# Patient Record
Sex: FEMALE | Race: WHITE | NOT HISPANIC OR LATINO | URBAN - METROPOLITAN AREA
[De-identification: names, ages, dates, MRNs, and addresses within clinical notes are randomized per-mention and may not be internally consistent; named-entity substitution may affect disease eponyms.]

---

## 2017-06-01 ENCOUNTER — OUTPATIENT (OUTPATIENT)
Dept: OUTPATIENT SERVICES | Facility: HOSPITAL | Age: 42
LOS: 1 days | End: 2017-06-01
Payer: MEDICAID

## 2017-06-07 DIAGNOSIS — R69 ILLNESS, UNSPECIFIED: ICD-10-CM

## 2018-06-01 PROCEDURE — G9005: CPT

## 2018-06-01 PROCEDURE — G9001: CPT

## 2018-07-01 ENCOUNTER — OUTPATIENT (OUTPATIENT)
Dept: OUTPATIENT SERVICES | Facility: HOSPITAL | Age: 43
LOS: 1 days | End: 2018-07-01
Payer: COMMERCIAL

## 2018-07-10 DIAGNOSIS — Z71.89 OTHER SPECIFIED COUNSELING: ICD-10-CM

## 2019-12-27 PROBLEM — Z00.00 ENCOUNTER FOR PREVENTIVE HEALTH EXAMINATION: Status: ACTIVE | Noted: 2019-12-27

## 2019-12-30 ENCOUNTER — APPOINTMENT (OUTPATIENT)
Dept: ORTHOPEDIC SURGERY | Facility: CLINIC | Age: 44
End: 2019-12-30
Payer: MEDICAID

## 2019-12-30 ENCOUNTER — TRANSCRIPTION ENCOUNTER (OUTPATIENT)
Age: 44
End: 2019-12-30

## 2019-12-30 VITALS
DIASTOLIC BLOOD PRESSURE: 95 MMHG | BODY MASS INDEX: 30.77 KG/M2 | WEIGHT: 203 LBS | SYSTOLIC BLOOD PRESSURE: 155 MMHG | HEART RATE: 83 BPM | HEIGHT: 68 IN

## 2019-12-30 DIAGNOSIS — Z87.09 PERSONAL HISTORY OF OTHER DISEASES OF THE RESPIRATORY SYSTEM: ICD-10-CM

## 2019-12-30 DIAGNOSIS — F17.200 NICOTINE DEPENDENCE, UNSPECIFIED, UNCOMPLICATED: ICD-10-CM

## 2019-12-30 DIAGNOSIS — Z86.718 PERSONAL HISTORY OF OTHER VENOUS THROMBOSIS AND EMBOLISM: ICD-10-CM

## 2019-12-30 DIAGNOSIS — Z78.9 OTHER SPECIFIED HEALTH STATUS: ICD-10-CM

## 2019-12-30 DIAGNOSIS — F17.210 NICOTINE DEPENDENCE, CIGARETTES, UNCOMPLICATED: ICD-10-CM

## 2019-12-30 DIAGNOSIS — M17.10 UNILATERAL PRIMARY OSTEOARTHRITIS, UNSPECIFIED KNEE: ICD-10-CM

## 2019-12-30 DIAGNOSIS — Z86.19 PERSONAL HISTORY OF OTHER INFECTIOUS AND PARASITIC DISEASES: ICD-10-CM

## 2019-12-30 PROCEDURE — 99205 OFFICE O/P NEW HI 60 MIN: CPT | Mod: 25

## 2019-12-30 PROCEDURE — 73562 X-RAY EXAM OF KNEE 3: CPT | Mod: RT

## 2019-12-30 PROCEDURE — 20610 DRAIN/INJ JOINT/BURSA W/O US: CPT | Mod: RT

## 2019-12-30 RX ORDER — DEXTROAMPHETAMINE SACCHARATE, AMPHETAMINE ASPARTATE, DEXTROAMPHETAMINE SULFATE, AND AMPHETAMINE SULFATE 5; 5; 5; 5 MG/1; MG/1; MG/1; MG/1
20 TABLET ORAL
Refills: 0 | Status: ACTIVE | COMMUNITY

## 2019-12-30 RX ORDER — IBUPROFEN 400 MG
400 TABLET ORAL
Refills: 0 | Status: ACTIVE | COMMUNITY

## 2019-12-30 RX ORDER — DIAZEPAM 10 MG/1
10 TABLET ORAL
Refills: 0 | Status: ACTIVE | COMMUNITY

## 2020-06-30 ENCOUNTER — APPOINTMENT (OUTPATIENT)
Dept: ORTHOPEDIC SURGERY | Facility: CLINIC | Age: 45
End: 2020-06-30
Payer: MEDICAID

## 2020-06-30 VITALS
DIASTOLIC BLOOD PRESSURE: 96 MMHG | HEART RATE: 83 BPM | WEIGHT: 206 LBS | HEIGHT: 68 IN | BODY MASS INDEX: 31.22 KG/M2 | SYSTOLIC BLOOD PRESSURE: 150 MMHG

## 2020-06-30 VITALS — TEMPERATURE: 98.6 F

## 2020-06-30 PROCEDURE — 20610 DRAIN/INJ JOINT/BURSA W/O US: CPT | Mod: RT

## 2020-06-30 PROCEDURE — 99213 OFFICE O/P EST LOW 20 MIN: CPT | Mod: 25

## 2020-06-30 NOTE — HISTORY OF PRESENT ILLNESS
[Pain Location] : pain [Worsening] : worsening [___ yrs] : [unfilled] year(s) ago [7] : a current pain level of 7/10 [5] : an average pain level of 5/10 [3] : a minimum pain level of 3/10 [8] : a maximum pain level of 8/10 [Standing] : standing [Walking] : worsened by walking [Daily] : ~He/She~ states the symptoms seem to be occuring daily [Rest] : relieved by rest [NSAIDs] : relieved by nonsteroidal anti-inflammatory drugs [de-identified] : 44 year old female here for f/u of right  knee pain. She is s/p right knee arthroscopy from 2003 after ACL injury to the right knee during her teens. She rates pain 8/10 in severity and describes pain as sharp and throbbing. Pain is worse with standing, navigating stairs, squatting, and when exercising. She is using compression wrap for support and pain relief. She reports medical history of endocarditis. She reports history of ulcers on her bilateral lower extremities. She does report current healing wound on left lower extremity. She reports history of pulmonary embolism. Patient currently taking Eliquis. Patient takes Gabapentin with relief of pain. She finished treatment with methadone. Patient not currently under treatment by pain management. \par pt states she recently come off of methadone.\par pt is taking advil few times a day that is allowed by her cardiologist. the advil helps a lot.\par pt states the last cortisone injection helped a lot.\par \par \par She states the symptoms are worsening. Pain levels include a current pain level of 8/10. \par She states the symptoms seem to be intermittent. \par

## 2020-06-30 NOTE — DISCUSSION/SUMMARY
[de-identified] : Surgical risks reviewed. 45 year old female with severe tricompartmental osteoarthritis of the right knee. She is high risk for surgery due to history of DVT, PE, and chance for relapse of previous narcotic addiction. We will continue with conservative treatment due to the risks. She elected to receive a cortisone injection in her right knee which she tolerated well. She will follow-up 3 months for repeat cortisone injection if needed. \par \par The patient is a 45 year old individual with end stage arthritis of their right knee joint. Based upon the patient's continued symptoms and failure to respond to conservative treatment I have recommended a right knee replacement for this patient. A long discussion took place with the patient describing what a total joint replacement is and what the procedure would entail. A total knee model, similar to the implant that will be used during the operation, was utilized to demonstrate and to discuss the various bearing surfaces of the implants. The hospitalization and post-operative care and rehabilitation were also discussed. The use of perioperative antibiotics and DVT prophylaxis were discussed. The risk, benefits and alternatives to a surgical intervention were discussed at length with the patient. The patient was also advised of risks related to the medical comorbidities and elevated body mass index (BMI).  A lengthy discussion took place to review the most common complications including but not limited to: deep vein thrombosis, pulmonary embolus, heart attack, stroke, infection, wound breakdown, numbness, damage to nerves, tendon, muscles, arteries or other blood vessels, death and other possible complications from anesthesia. The patient was told that we will take steps to minimize these risks by using sterile technique, antibiotics and DVT prophylaxis when appropriate and follow the patient postoperatively in the office setting to monitor progress. The possibility of recurrent pain, no improvement in pain and actual worsening of pain were also discussed with the patient.\par The discharge plan of care focused on the patient going home following surgery.  The patient was encouraged to make the necessary arrangements to have someone stay with them when they are discharged home.  Following discharge, a home care nurse will visit the patient.  The home care nurse will open your home care case and request home physical therapy services.  Home physical therapy will commence following discharge provided it is appropriate and covered by the health insurance benefit plan. \par The benefits of surgery were discussed with the patient including the potential for improving his/her current clinical condition through operative intervention. Alternatives to surgical intervention including continued conservative management were also discussed in detail. All questions were answered to the satisfaction of the patient. The treatment plan of care, as well as a model of a total knee equivalent to the one that will be used for their total joint replacement, was shared with the patient.  The patient agreed to the plan of care as well as the use of implants in their total joint replacement.\par

## 2020-06-30 NOTE — PHYSICAL EXAM

## 2020-06-30 NOTE — PROCEDURE
[de-identified] : I injected the patient's right knee today with cortisone.\par \par I discussed at length with the patient the planned steroid and lidocaine injection. The risks, benefits, convalescence and alternatives were reviewed. The possible side effects discussed included but were not limited to: pain, swelling, heat, bleeding, and redness. Symptoms are generally mild but if they are extensive then contact the office. Giving pain relievers by mouth such as NSAIDs or Tylenol can generally treat the reactions to steroid and lidocaine. Rare cases of infection have been noted. Rash, hives and itching may occur post injection. If you have muscle pain or cramps, flushing and or swelling of the face, rapid heart beat, nausea, dizziness, fever, chills, headache, difficulty breathing, swelling in the arms or legs, or have a prickly feeling of your skin, contact a health care provider immediately. Following this discussion, the knee was prepped with Alcohol and under sterile condition the 80 mg Depo-Medrol and 6 cc Lidocaine injection was performed with a 20 gauge needle through a superolateral injection site. The needle was introduced into the joint, aspiration was performed to ensure intra-articular placement and the medication was injected. Upon withdrawal of the needle the site was cleaned with alcohol and a band aid applied. The patient tolerated the injection well and there were no adverse effects. Post injection instructions included no strenuous activity for 24 hours, cryotherapy and if there are any adverse effects to contact the office. \par \par

## 2020-09-30 ENCOUNTER — APPOINTMENT (OUTPATIENT)
Dept: ORTHOPEDIC SURGERY | Facility: CLINIC | Age: 45
End: 2020-09-30
Payer: MEDICAID

## 2020-09-30 VITALS
HEART RATE: 76 BPM | SYSTOLIC BLOOD PRESSURE: 147 MMHG | WEIGHT: 206 LBS | BODY MASS INDEX: 31.22 KG/M2 | DIASTOLIC BLOOD PRESSURE: 89 MMHG | HEIGHT: 68 IN

## 2020-09-30 PROCEDURE — 99214 OFFICE O/P EST MOD 30 MIN: CPT | Mod: 25

## 2020-09-30 PROCEDURE — 20610 DRAIN/INJ JOINT/BURSA W/O US: CPT

## 2020-09-30 NOTE — HISTORY OF PRESENT ILLNESS
[Pain Location] : pain [Worsening] : worsening [___ yrs] : [unfilled] year(s) ago [7] : a current pain level of 7/10 [5] : an average pain level of 5/10 [3] : a minimum pain level of 3/10 [8] : a maximum pain level of 8/10 [Standing] : standing [Daily] : ~He/She~ states the symptoms seem to be occuring daily [Walking] : worsened by walking [NSAIDs] : relieved by nonsteroidal anti-inflammatory drugs [Rest] : relieved by rest [de-identified] : 45 year old female here for f/u of right  knee pain. She is s/p right knee arthroscopy from 2003 after ACL injury to the right knee during her teens. She describes pain as sharp and throbbing. Pain is worse with standing, navigating stairs, squatting, and when exercising. She is using compression wrap for support and pain relief. She reports medical history of endocarditis. She reports history of ulcers on her bilateral lower extremities. She does report current healing wound on left lower extremity. She reports history of pulmonary embolism. She is now able to stop taking blood thinners Patient takes Gabapentin with relief of pain. She finished treatment with methadone several months ago; however, she started taking methadone again recently for pain management. Pt states the last cortisone injection helped a lot.

## 2020-09-30 NOTE — PROCEDURE
[de-identified] : I injected the patient's right knee today with cortisone.\par \par I discussed at length with the patient the planned steroid and lidocaine injection. The risks, benefits, convalescence and alternatives were reviewed. The possible side effects discussed included but were not limited to: pain, swelling, heat, bleeding, and redness. Symptoms are generally mild but if they are extensive then contact the office. Giving pain relievers by mouth such as NSAIDs or Tylenol can generally treat the reactions to steroid and lidocaine. Rare cases of infection have been noted. Rash, hives and itching may occur post injection. If you have muscle pain or cramps, flushing and or swelling of the face, rapid heart beat, nausea, dizziness, fever, chills, headache, difficulty breathing, swelling in the arms or legs, or have a prickly feeling of your skin, contact a health care provider immediately. Following this discussion, the knee was prepped with Alcohol and under sterile condition the 80 mg Depo-Medrol and 6 cc Lidocaine injection was performed with a 20 gauge needle through a superolateral injection site. The needle was introduced into the joint, aspiration was performed to ensure intra-articular placement and the medication was injected. Upon withdrawal of the needle the site was cleaned with alcohol and a band aid applied. The patient tolerated the injection well and there were no adverse effects. Post injection instructions included no strenuous activity for 24 hours, cryotherapy and if there are any adverse effects to contact the office. \par \par

## 2020-09-30 NOTE — DISCUSSION/SUMMARY
[Surgical risks reviewed] : Surgical risks reviewed [de-identified] : 45 year old female with severe tricompartmental osteoarthritis of the right knee. Conservative therapy and surgical options discussed in detail with the patient. She is a candidate for a right TKA. She is high risk for surgery due to history of DVT, PE, and chance for relapse of previous narcotic addiction. She elected to receive a cortisone injection in her right knee which she tolerated well. The patient will f/u with her pain management doctor prior to surgery. Pt understands that she will need to take blood thinners after surgery. Pt scheduled surgery today.\par \par \par The patient is a 45 year old individual with end stage arthritis of their right knee joint. Based upon the patient's continued symptoms and failure to respond to conservative treatment I have recommended a right knee replacement for this patient. A long discussion took place with the patient describing what a total joint replacement is and what the procedure would entail. A total knee model, similar to the implant that will be used during the operation, was utilized to demonstrate and to discuss the various bearing surfaces of the implants. The hospitalization and post-operative care and rehabilitation were also discussed. The use of perioperative antibiotics and DVT prophylaxis were discussed. The risk, benefits and alternatives to a surgical intervention were discussed at length with the patient. The patient was also advised of risks related to the medical comorbidities and elevated body mass index (BMI).  A lengthy discussion took place to review the most common complications including but not limited to: deep vein thrombosis, pulmonary embolus, heart attack, stroke, infection, wound breakdown, numbness, damage to nerves, tendon, muscles, arteries or other blood vessels, death and other possible complications from anesthesia. The patient was told that we will take steps to minimize these risks by using sterile technique, antibiotics and DVT prophylaxis when appropriate and follow the patient postoperatively in the office setting to monitor progress. The possibility of recurrent pain, no improvement in pain and actual worsening of pain were also discussed with the patient.\par The discharge plan of care focused on the patient going home following surgery.  The patient was encouraged to make the necessary arrangements to have someone stay with them when they are discharged home.  Following discharge, a home care nurse will visit the patient.  The home care nurse will open your home care case and request home physical therapy services.  Home physical therapy will commence following discharge provided it is appropriate and covered by the health insurance benefit plan. \par The benefits of surgery were discussed with the patient including the potential for improving his/her current clinical condition through operative intervention. Alternatives to surgical intervention including continued conservative management were also discussed in detail. All questions were answered to the satisfaction of the patient. The treatment plan of care, as well as a model of a total knee equivalent to the one that will be used for their total joint replacement, was shared with the patient.  The patient agreed to the plan of care as well as the use of implants in their total joint replacement.

## 2020-09-30 NOTE — END OF VISIT
[FreeTextEntry3] : I, oJshua Stephens, acted solely as a scribe for Dr. Yeison Sauer on this date 09/30/2020.

## 2020-09-30 NOTE — PHYSICAL EXAM

## 2020-12-24 DIAGNOSIS — Z01.818 ENCOUNTER FOR OTHER PREPROCEDURAL EXAMINATION: ICD-10-CM

## 2020-12-26 ENCOUNTER — APPOINTMENT (OUTPATIENT)
Dept: DISASTER EMERGENCY | Facility: CLINIC | Age: 45
End: 2020-12-26

## 2020-12-26 LAB — SARS-COV-2 N GENE NPH QL NAA+PROBE: NOT DETECTED

## 2020-12-28 ENCOUNTER — OUTPATIENT (OUTPATIENT)
Dept: OUTPATIENT SERVICES | Facility: HOSPITAL | Age: 45
LOS: 1 days | End: 2020-12-28
Payer: COMMERCIAL

## 2020-12-28 ENCOUNTER — RESULT REVIEW (OUTPATIENT)
Age: 45
End: 2020-12-28

## 2020-12-28 VITALS
SYSTOLIC BLOOD PRESSURE: 110 MMHG | HEART RATE: 76 BPM | TEMPERATURE: 98 F | DIASTOLIC BLOOD PRESSURE: 80 MMHG | RESPIRATION RATE: 16 BRPM | HEIGHT: 68 IN | WEIGHT: 190.48 LBS

## 2020-12-28 DIAGNOSIS — Z01.818 ENCOUNTER FOR OTHER PREPROCEDURAL EXAMINATION: ICD-10-CM

## 2020-12-28 DIAGNOSIS — Z13.89 ENCOUNTER FOR SCREENING FOR OTHER DISORDER: ICD-10-CM

## 2020-12-28 DIAGNOSIS — Z29.9 ENCOUNTER FOR PROPHYLACTIC MEASURES, UNSPECIFIED: ICD-10-CM

## 2020-12-28 DIAGNOSIS — M17.11 UNILATERAL PRIMARY OSTEOARTHRITIS, RIGHT KNEE: ICD-10-CM

## 2020-12-28 DIAGNOSIS — Z98.890 OTHER SPECIFIED POSTPROCEDURAL STATES: Chronic | ICD-10-CM

## 2020-12-28 LAB
A1C WITH ESTIMATED AVERAGE GLUCOSE RESULT: 5.6 % — SIGNIFICANT CHANGE UP (ref 4–5.6)
ALBUMIN SERPL ELPH-MCNC: 4.2 G/DL — SIGNIFICANT CHANGE UP (ref 3.3–5.2)
ALP SERPL-CCNC: 112 U/L — SIGNIFICANT CHANGE UP (ref 40–120)
ALT FLD-CCNC: 10 U/L — SIGNIFICANT CHANGE UP
ANION GAP SERPL CALC-SCNC: 11 MMOL/L — SIGNIFICANT CHANGE UP (ref 5–17)
APTT BLD: 39.5 SEC — HIGH (ref 27.5–35.5)
AST SERPL-CCNC: 18 U/L — SIGNIFICANT CHANGE UP
BASOPHILS # BLD AUTO: 0.04 K/UL — SIGNIFICANT CHANGE UP (ref 0–0.2)
BASOPHILS NFR BLD AUTO: 0.5 % — SIGNIFICANT CHANGE UP (ref 0–2)
BILIRUB SERPL-MCNC: 0.3 MG/DL — LOW (ref 0.4–2)
BLD GP AB SCN SERPL QL: SIGNIFICANT CHANGE UP
BUN SERPL-MCNC: 10 MG/DL — SIGNIFICANT CHANGE UP (ref 8–20)
CALCIUM SERPL-MCNC: 9.6 MG/DL — SIGNIFICANT CHANGE UP (ref 8.6–10.2)
CHLORIDE SERPL-SCNC: 100 MMOL/L — SIGNIFICANT CHANGE UP (ref 98–107)
CO2 SERPL-SCNC: 28 MMOL/L — SIGNIFICANT CHANGE UP (ref 22–29)
CREAT SERPL-MCNC: 0.65 MG/DL — SIGNIFICANT CHANGE UP (ref 0.5–1.3)
EOSINOPHIL # BLD AUTO: 0.23 K/UL — SIGNIFICANT CHANGE UP (ref 0–0.5)
EOSINOPHIL NFR BLD AUTO: 2.7 % — SIGNIFICANT CHANGE UP (ref 0–6)
ESTIMATED AVERAGE GLUCOSE: 114 MG/DL — SIGNIFICANT CHANGE UP (ref 68–114)
GLUCOSE SERPL-MCNC: 90 MG/DL — SIGNIFICANT CHANGE UP (ref 70–99)
HCT VFR BLD CALC: 45.9 % — HIGH (ref 34.5–45)
HGB BLD-MCNC: 14.9 G/DL — SIGNIFICANT CHANGE UP (ref 11.5–15.5)
IMM GRANULOCYTES NFR BLD AUTO: 0.2 % — SIGNIFICANT CHANGE UP (ref 0–1.5)
INR BLD: 1.16 RATIO — SIGNIFICANT CHANGE UP (ref 0.88–1.16)
LYMPHOCYTES # BLD AUTO: 2.88 K/UL — SIGNIFICANT CHANGE UP (ref 1–3.3)
LYMPHOCYTES # BLD AUTO: 34.4 % — SIGNIFICANT CHANGE UP (ref 13–44)
MCHC RBC-ENTMCNC: 30 PG — SIGNIFICANT CHANGE UP (ref 27–34)
MCHC RBC-ENTMCNC: 32.5 GM/DL — SIGNIFICANT CHANGE UP (ref 32–36)
MCV RBC AUTO: 92.4 FL — SIGNIFICANT CHANGE UP (ref 80–100)
MONOCYTES # BLD AUTO: 0.5 K/UL — SIGNIFICANT CHANGE UP (ref 0–0.9)
MONOCYTES NFR BLD AUTO: 6 % — SIGNIFICANT CHANGE UP (ref 2–14)
MRSA PCR RESULT.: SIGNIFICANT CHANGE UP
NEUTROPHILS # BLD AUTO: 4.7 K/UL — SIGNIFICANT CHANGE UP (ref 1.8–7.4)
NEUTROPHILS NFR BLD AUTO: 56.2 % — SIGNIFICANT CHANGE UP (ref 43–77)
PLATELET # BLD AUTO: 261 K/UL — SIGNIFICANT CHANGE UP (ref 150–400)
POTASSIUM SERPL-MCNC: 4.4 MMOL/L — SIGNIFICANT CHANGE UP (ref 3.5–5.3)
POTASSIUM SERPL-SCNC: 4.4 MMOL/L — SIGNIFICANT CHANGE UP (ref 3.5–5.3)
PROT SERPL-MCNC: 9.1 G/DL — HIGH (ref 6.6–8.7)
PROTHROM AB SERPL-ACNC: 13.4 SEC — SIGNIFICANT CHANGE UP (ref 10.6–13.6)
RBC # BLD: 4.97 M/UL — SIGNIFICANT CHANGE UP (ref 3.8–5.2)
RBC # FLD: 13.7 % — SIGNIFICANT CHANGE UP (ref 10.3–14.5)
S AUREUS DNA NOSE QL NAA+PROBE: SIGNIFICANT CHANGE UP
SODIUM SERPL-SCNC: 139 MMOL/L — SIGNIFICANT CHANGE UP (ref 135–145)
WBC # BLD: 8.37 K/UL — SIGNIFICANT CHANGE UP (ref 3.8–10.5)
WBC # FLD AUTO: 8.37 K/UL — SIGNIFICANT CHANGE UP (ref 3.8–10.5)

## 2020-12-28 PROCEDURE — 71046 X-RAY EXAM CHEST 2 VIEWS: CPT | Mod: 26

## 2020-12-28 PROCEDURE — G0463: CPT

## 2020-12-28 PROCEDURE — 93005 ELECTROCARDIOGRAM TRACING: CPT

## 2020-12-28 PROCEDURE — 93010 ELECTROCARDIOGRAM REPORT: CPT

## 2020-12-28 PROCEDURE — 71046 X-RAY EXAM CHEST 2 VIEWS: CPT

## 2020-12-28 NOTE — H&P PST ADULT - HISTORY OF PRESENT ILLNESS
44 yo F c/o right knee pain. She is s/p right knee arthroscopy from 2003 after ACL injury to the right knee during her teens. She describes pain as sharp and throbbing. Pain is worse with standing, navigating stairs, squatting, and when exercising. She is using compression wrap for support and pain relief. She reports medical history of endocarditis. She reports history of ulcers on her bilateral lower extremities. She does report current healing wound on left lower extremity. She reports history of pulmonary embolism. Pt takes Gabapentin with relief of pain. She finished treatment with methadone several months ago; however, she started taking methadone again recently for pain management. Pt states the last cortisone injection helped a lot.  She states the symptoms are worsening. Pt reports the symptoms started many years ago. Pain levels include a current pain level of 7/10, an average pain level of 5/10, a minimum pain level of 3/10 and a maximum pain level of 8/10. Her symptoms occur while walking and standing. She states the symptoms seem to be occuring daily. Modifying factors: worsened by walking. Relieving factors include nonsteroidal anti-inflammatory drugs and rest. Presents today for preop assessment prior to right TKR w/Dr Sauer on 1/15       44 yo F PMH of DVT (on eliquis), OA of right knee, c/o right knee pain. Pt is s/p right knee arthroscopy from 2003 after ACL injury to the right knee during her teens. She describes pain as sharp, throbbing and excruciating. Pain is worse with standing, navigating stairs, squatting, and when exercising. She is using compression wrap for support and pain relief. She reports medical history of endocarditis. Pt reports Hx of ulcers on her bilateral lower extremities. She does report current healing wound on left and right LE. She reports history of pulmonary embolism. Pt takes Gabapentin with relief of pain. She finished treatment with methadone several months ago; however, she started taking methadone again recently for pain management. Pt states the last cortisone injection helped a lot.  She states the symptoms are worsening. Pt reports the symptoms started many years ago. Pain levels include a current pain level of 7/10, an average pain level of 5/10, a minimum pain level of 3/10 and a maximum pain level of 8/10. Her symptoms occur while walking and standing. She states the symptoms seem to be occuring daily. Modifying factors: worsened by walking. Relieving factors include nonsteroidal anti-inflammatory drugs and rest. Presents today for preop assessment prior to right TKR w/Dr Saure on 1/15       44 yo F PMH of COPD, DVT (on eliquis), current smoker, depresson and anxiety, OA of right knee, c/o right knee pain. Pt is s/p right knee arthroscopy from 2003 after ACL injury to the right knee during her teens. She describes pain as sharp, throbbing and excruciating. Pain is worse with standing, navigating stairs, squatting, and when exercising. She is using compression wrap for support and pain relief. She reports medical history of endocarditis. Pt reports Hx of ulcers on her bilateral lower extremities. She does report current healing wound on left and right LE. She reports history of pulmonary embolism. Pt takes Gabapentin with relief of pain. She finished treatment with methadone several months ago; however, she started taking methadone again recently for pain management. Pt states the last cortisone injection helped a lot.  She states the symptoms are worsening. Pt reports the symptoms started many years ago. Pain levels include a current pain level of 7/10, an average pain level of 5/10, a minimum pain level of 3/10 and a maximum pain level of 8/10. Her symptoms occur while walking and standing. She states the symptoms seem to be occuring daily. Modifying factors: worsened by walking. Relieving factors include nonsteroidal anti-inflammatory drugs and rest. Presents today for preop assessment prior to right TKR w/Dr Sauer on 1/15       46 yo F PMH of COPD, DVT/PE (on eliquis), current smoker, depression and anxiety, OA of right knee, c/o right knee pain. Pt had right knee arthroscopy in 2003 after ACL injury to the right knee during her teens. Pt describes her pain as excruciating. Pain is worse with standing, navigating stairs, squatting, and when exercising. She is using compression wrap for support and pain relief. She reports medical history of endocarditis. Pt reports Hx of ulcers on her bilateral lower extremities. She has current healed wounds on left and right LE. Pt takes Gabapentin with relief of pain. She finished treatment with methadone several months ago; however, she started taking methadone again recently for pain management. Pt states the last cortisone injection helped a lot.  She states the symptoms are worsening. Pt reports the symptoms started many years ago. Pain levels include a current pain level of 7/10, an average pain level of 5/10, a minimum pain level of 3/10 and a maximum pain level of 8/10. Her symptoms occur while walking and standing. She states the symptoms seem to be occuring daily. Modifying factors: worsened by walking. Relieving factors include nonsteroidal anti-inflammatory drugs and rest. Presents today for preop assessment prior to right TKR w/Dr Sauer on 1/15       44 yo F PMH of COPD, DVT/PE (on eliquis), current smoker, depression and anxiety, OA of right knee, c/o right knee pain. Pt had right knee arthroscopy in 2003 after ACL injury to the right knee during her teens. Pt describes her pain as chronic, excruciating and aggravated by standing, navigating stairs, squatting, and exercising. She reports medical history of endocarditis. Pt reports Hx of ulcers on her bilateral lower extremities. She has current healed wounds on left and right LE. She finished treatment with methadone several months ago; however, she started taking methadone again recently for pain management. She states the symptoms are worsening. Pain levels include an average pain level of 5/10, a minimum pain level of 3/10, and a maximum pain level of 10/10. Modifying factors: worsened by walking. Relieving factors include NSAIDs, steroid injections, rest, and wearing a compression wrap. Presents today for preop assessment prior to right TKR w/Dr Sauer on 1/15       46 yo F PMH of COPD, DVT/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 Pt had right knee arthroscopy in 2003 after ACL injury to the right knee during her teens. Pt describes her pain as chronic, excruciating and aggravated by standing, navigating stairs, squatting, and exercising. She reports medical history of endocarditis. Pt reports Hx of ulcers on her bilateral lower extremities. She has current healed wounds on left and right LE. She finished treatment with methadone several months ago; however, she started taking methadone again recently for pain management. She states the symptoms are worsening. Pain levels include an average pain level of 5/10, a minimum pain level of 3/10, and a maximum pain level of 10/10. Modifying factors: worsened by walking. Relieving factors include NSAIDs, steroid injections, rest, and wearing a compression wrap. Presents today for preop assessment prior to right TKR w/Dr Sauer on 1/15

## 2020-12-28 NOTE — H&P PST ADULT - NSICDXPASTMEDICALHX_GEN_ALL_CORE_FT
PAST MEDICAL HISTORY:  Osteoarthritis of right knee      PAST MEDICAL HISTORY:  Anxiety and depression     COPD without exacerbation     Current smoker     H/O endocarditis     Osteoarthritis of right knee     Personal history of DVT (deep vein thrombosis) on Eliquis

## 2020-12-28 NOTE — H&P PST ADULT - ASSESSMENT
46 yo F c/o right knee pain. She is s/p right knee arthroscopy from  after ACL injury to the right knee during her teens. She describes pain as sharp and throbbing. Pain is worse with standing, navigating stairs, squatting, and when exercising. She is using compression wrap for support and pain relief. She reports medical history of endocarditis. She reports history of ulcers on her bilateral lower extremities. She does report current healing wound on left lower extremity. She reports history of pulmonary embolism. Pt takes Gabapentin with relief of pain. She finished treatment with methadone several months ago; however, she started taking methadone again recently for pain management. Pt states the last cortisone injection helped a lot.  She states the symptoms are worsening. Pt reports the symptoms started many years ago. Pain levels include a current pain level of 7/10, an average pain level of 5/10, a minimum pain level of 3/10 and a maximum pain level of 8/10. Her symptoms occur while walking and standing. She states the symptoms seem to be occuring daily. Modifying factors: worsened by walking. Relieving factors include nonsteroidal anti-inflammatory drugs and rest. Presents today for preop assessment prior to right TKR w/Dr Sauer on 1/15      OPIOID RISK TOOL    MIGUELANGEL EACH BOX THAT APPLIES AND ADD TOTALS AT THE END    FAMILY HISTORY OF SUBSTANCE ABUSE                 FEMALE         MALE                                                Alcohol                             [  ]1 pt          [  ]3pts                                               Illegal Durgs                     [  ]2 pts        [  ]3pts                                               Rx Drugs                           [  ]4 pts        [  ]4 pts    PERSONAL HISTORY OF SUBSTANCE ABUSE                                                                                          Alcohol                             [  ]3 pts       [  ]3 pts                                               Illegal Drugs                     [  ]4 pts        [  ]4 pts                                               Rx Drugs                           [  ]5 pts        [  ]5 pts    AGE BETWEEN 16-45 YEARS                                      [  ]1 pt         [  ]1 pt    HISTORY OF PREADOLESCENT   SEXUAL ABUSE                                                             [  ]3 pts        [  ]0pts    PSYCHOLOGICAL DISEASE                     ADD, OCD, Bipolar, Schizophrenia        [  ]2 pts         [  ]2 pts                      Depression                                               [  ]1 pt           [  ]1 pt           SCORING TOTAL   (add numbers and type here)              ( 0 )                                     A score of 3 or lower indicated LOW risk for future opioid abuse  A score of 4 to 7 indicated moderate risk for future opioid abuse  A score of 8 or higher indicates a high risk for opioid abuse    CAPRINI SCORE [CLOT]    AGE RELATED RISK FACTORS                                                       MOBILITY RELATED FACTORS  [x ] Age 41-60 years                                            (1 Point)                  [ ] Bed rest                                                        (1 Point)  [ ] Age: 61-74 years                                           (2 Points)                 [ ] Plaster cast                                                   (2 Points)  [ ] Age= 75 years                                              (3 Points)                 [ ] Bed bound for more than 72 hours                 (2 Points)    DISEASE RELATED RISK FACTORS                                               GENDER SPECIFIC FACTORS  [ ] Edema in the lower extremities                       (1 Point)                  [ ] Pregnancy                                                     (1 Point)  [ ] Varicose veins                                               (1 Point)                  [ ] Post-partum < 6 weeks                                   (1 Point)             [ ] BMI > 25 Kg/m2                                            (1 Point)                  [ ] Hormonal therapy  or oral contraception          (1 Point)                 [ ] Sepsis (in the previous month)                        (1 Point)                  [ ] History of pregnancy complications                 (1 point)  [ ] Pneumonia or serious lung disease                                               [ ] Unexplained or recurrent                     (1 Point)           (in the previous month)                               (1 Point)  [ ] Abnormal pulmonary function test                     (1 Point)                 SURGERY RELATED RISK FACTORS  [ ] Acute myocardial infarction                              (1 Point)                 [ ]  Section                                             (1 Point)  [ ] Congestive heart failure (in the previous month)  (1 Point)               [ ] Minor surgery                                                  (1 Point)   [ ] Inflammatory bowel disease                             (1 Point)                 [ ] Arthroscopic surgery                                        (2 Points)  [ ] Central venous access                                      (2 Points)                [ x] General surgery lasting more than 45 minutes   (2 Points)       [ ] Stroke (in the previous month)                          (5 Points)               [ ] Elective arthroplasty                                         (5 Points)                                                                                                                                               HEMATOLOGY RELATED FACTORS                                                 TRAUMA RELATED RISK FACTORS  [ x] Prior episodes of VTE                                     (3 Points)                [ ] Fracture of the hip, pelvis, or leg                       (5 Points)  [ ] Positive family history for VTE                         (3 Points)                 [ ] Acute spinal cord injury (in the previous month)  (5 Points)  [ ] Prothrombin 29617 A                                     (3 Points)                 [ ] Paralysis  (less than 1 month)                             (5 Points)  [ ] Factor V Leiden                                             (3 Points)                  [ ] Multiple Trauma within 1 month                        (5 Points)  [ ] Lupus anticoagulants                                     (3 Points)                                                           [ ] Anticardiolipin antibodies                               (3 Points)                                                       [ ] High homocysteine in the blood                      (3 Points)                                             [ ] Other congenital or acquired thrombophilia      (3 Points)                                                [ ] Heparin induced thrombocytopenia                  (3 Points)                                          Total Score [     6     ]    Caprini Score 0 - 2:  Low Risk, No VTE Prophylaxis required for most patients, encourage ambulation  Caprini Score 3 - 6:  At Risk, pharmacologic VTE prophylaxis is indicated for most patients (in the absence of a contraindication)  Caprini Score Greater than or = 7:  High Risk, pharmacologic VTE prophylaxis is indicated for most patients (in the absence of a contraindication) 44 yo F PMH of COPD, DVT/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 Pt had right knee arthroscopy in  after ACL injury to the right knee during her teens. Pt describes her pain as chronic, excruciating and aggravated by standing, navigating stairs, squatting, and exercising. She reports medical history of endocarditis. Pt reports Hx of ulcers on her bilateral lower extremities. She has current healed wounds on left and right LE. She finished treatment with methadone several months ago; however, she started taking methadone again recently for pain management. She states the symptoms are worsening. Pain levels include an average pain level of 5/10, a minimum pain level of 3/10, and a maximum pain level of 10/10. Modifying factors: worsened by walking. Relieving factors include NSAIDs, steroid injections, rest, and wearing a compression wrap. Presents today for preop assessment prior to right TKR w/Dr Sauer on 1/15    OPIOID RISK TOOL    MIGUELANGEL EACH BOX THAT APPLIES AND ADD TOTALS AT THE END    FAMILY HISTORY OF SUBSTANCE ABUSE                 FEMALE         MALE                                                Alcohol                             [  ]1 pt          [  ]3pts                                               Illegal Durgs                     [  ]2 pts        [  ]3pts                                               Rx Drugs                           [  ]4 pts        [  ]4 pts    PERSONAL HISTORY OF SUBSTANCE ABUSE                                                                                          Alcohol                             [  ]3 pts       [  ]3 pts                                               Illegal Drugs                     [x  ]4 pts        [  ]4 pts                                               Rx Drugs                           [  ]5 pts        [  ]5 pts    AGE BETWEEN 16-45 YEARS                                      [  ]1 pt         [  ]1 pt    HISTORY OF PREADOLESCENT   SEXUAL ABUSE                                                             [  ]3 pts        [  ]0pts    PSYCHOLOGICAL DISEASE                     ADD, OCD, Bipolar, Schizophrenia        [  ]2 pts         [  ]2 pts                      Depression                                               [x  ]1 pt           [  ]1 pt           SCORING TOTAL   (add numbers and type here)              ( 5 )                                     A score of 3 or lower indicated LOW risk for future opioid abuse  A score of 4 to 7 indicated moderate risk for future opioid abuse  A score of 8 or higher indicates a high risk for opioid abuse    CAPRINI SCORE [CLOT]    AGE RELATED RISK FACTORS                                                       MOBILITY RELATED FACTORS  [x ] Age 41-60 years                                            (1 Point)                  [ ] Bed rest                                                        (1 Point)  [ ] Age: 61-74 years                                           (2 Points)                 [ ] Plaster cast                                                   (2 Points)  [ ] Age= 75 years                                              (3 Points)                 [ ] Bed bound for more than 72 hours                 (2 Points)    DISEASE RELATED RISK FACTORS                                               GENDER SPECIFIC FACTORS  [ x] Edema in the lower extremities                       (1 Point)                  [ ] Pregnancy                                                     (1 Point)  [ ] Varicose veins                                               (1 Point)                  [ ] Post-partum < 6 weeks                                   (1 Point)             [x] BMI > 25 Kg/m2                                            (1 Point)                  [ ] Hormonal therapy  or oral contraception          (1 Point)                 [ ] Sepsis (in the previous month)                        (1 Point)                  [ ] History of pregnancy complications                 (1 point)  [ ] Pneumonia or serious lung disease                                               [ ] Unexplained or recurrent                     (1 Point)           (in the previous month)                               (1 Point)  [ ] Abnormal pulmonary function test                     (1 Point)                 SURGERY RELATED RISK FACTORS  [ ] Acute myocardial infarction                              (1 Point)                 [ ]  Section                                             (1 Point)  [ ] Congestive heart failure (in the previous month)  (1 Point)               [ ] Minor surgery                                                  (1 Point)   [ ] Inflammatory bowel disease                             (1 Point)                 [ ] Arthroscopic surgery                                        (2 Points)  [ ] Central venous access                                      (2 Points)                [ x] General surgery lasting more than 45 minutes   (2 Points)       [ ] Stroke (in the previous month)                          (5 Points)               [ ] Elective arthroplasty                                         (5 Points)                                                                                                                                               HEMATOLOGY RELATED FACTORS                                                 TRAUMA RELATED RISK FACTORS  [ x] Prior episodes of VTE                                     (3 Points)                [ ] Fracture of the hip, pelvis, or leg                       (5 Points)  [ ] Positive family history for VTE                         (3 Points)                 [ ] Acute spinal cord injury (in the previous month)  (5 Points)  [ ] Prothrombin 45225 A                                     (3 Points)                 [ ] Paralysis  (less than 1 month)                             (5 Points)  [ ] Factor V Leiden                                             (3 Points)                  [ ] Multiple Trauma within 1 month                        (5 Points)  [ ] Lupus anticoagulants                                     (3 Points)                                                           [ ] Anticardiolipin antibodies                               (3 Points)                                                       [ ] High homocysteine in the blood                      (3 Points)                                             [ ] Other congenital or acquired thrombophilia      (3 Points)                                                [ ] Heparin induced thrombocytopenia                  (3 Points)                                          Total Score [     8   ]    Caprini Score 0 - 2:  Low Risk, No VTE Prophylaxis required for most patients, encourage ambulation  Caprini Score 3 - 6:  At Risk, pharmacologic VTE prophylaxis is indicated for most patients (in the absence of a contraindication)  Caprini Score Greater than or = 7:  High Risk, pharmacologic VTE prophylaxis is indicated for most patients (in the absence of a contraindication)

## 2020-12-28 NOTE — H&P PST ADULT - OTHER CARE PROVIDERS
PCP Lavonne PCP Luis Banerjee (pulmonary), Sera (cardiology) PCP Luis Banerjee (pulmonary), Wendie (cardiology) PCP Luis Banerjee (pulmonary), Wendie (cardiology), pain management

## 2020-12-28 NOTE — H&P PST ADULT - NSICDXPROBLEM_GEN_ALL_CORE_FT
PROBLEM DIAGNOSES  Problem: Osteoarthritis of right knee  Assessment and Plan: preop assessment, medical, cardiac, pulmonary, pain management clearances pending    Problem: Need for prophylactic measure  Assessment and Plan: caprini score 8, high risk for dvt SCD ordered, surgical team to assess for dvt prophylaxis    Problem: Screening for substance abuse  Assessment and Plan: ort score 5, high risk, medical and pain management clearances pending

## 2020-12-29 PROBLEM — F41.9 ANXIETY DISORDER, UNSPECIFIED: Chronic | Status: ACTIVE | Noted: 2020-12-28

## 2020-12-29 PROBLEM — Z86.718 PERSONAL HISTORY OF OTHER VENOUS THROMBOSIS AND EMBOLISM: Chronic | Status: ACTIVE | Noted: 2020-12-28

## 2020-12-29 PROBLEM — F17.200 NICOTINE DEPENDENCE, UNSPECIFIED, UNCOMPLICATED: Chronic | Status: ACTIVE | Noted: 2020-12-28

## 2020-12-29 PROBLEM — M17.11 UNILATERAL PRIMARY OSTEOARTHRITIS, RIGHT KNEE: Chronic | Status: ACTIVE | Noted: 2020-12-28

## 2020-12-29 PROBLEM — J44.9 CHRONIC OBSTRUCTIVE PULMONARY DISEASE, UNSPECIFIED: Chronic | Status: ACTIVE | Noted: 2020-12-28

## 2020-12-29 PROBLEM — Z86.79 PERSONAL HISTORY OF OTHER DISEASES OF THE CIRCULATORY SYSTEM: Chronic | Status: ACTIVE | Noted: 2020-12-28

## 2021-01-08 ENCOUNTER — APPOINTMENT (OUTPATIENT)
Dept: ORTHOPEDIC SURGERY | Facility: CLINIC | Age: 46
End: 2021-01-08
Payer: MEDICAID

## 2021-01-08 VITALS
DIASTOLIC BLOOD PRESSURE: 94 MMHG | HEIGHT: 68 IN | BODY MASS INDEX: 31.22 KG/M2 | HEART RATE: 80 BPM | WEIGHT: 206 LBS | SYSTOLIC BLOOD PRESSURE: 148 MMHG

## 2021-01-08 DIAGNOSIS — M17.11 UNILATERAL PRIMARY OSTEOARTHRITIS, RIGHT KNEE: ICD-10-CM

## 2021-01-08 DIAGNOSIS — I27.82 CHRONIC PULMONARY EMBOLISM: ICD-10-CM

## 2021-01-08 DIAGNOSIS — Z79.891 LONG TERM (CURRENT) USE OF OPIATE ANALGESIC: ICD-10-CM

## 2021-01-08 PROCEDURE — 99214 OFFICE O/P EST MOD 30 MIN: CPT

## 2021-01-08 PROCEDURE — 99072 ADDL SUPL MATRL&STAF TM PHE: CPT

## 2021-01-08 NOTE — HISTORY OF PRESENT ILLNESS
[Pain Location] : pain [Worsening] : worsening [___ yrs] : [unfilled] year(s) ago [7] : a current pain level of 7/10 [5] : an average pain level of 5/10 [3] : a minimum pain level of 3/10 [8] : a maximum pain level of 8/10 [Standing] : standing [Daily] : ~He/She~ states the symptoms seem to be occuring daily [Walking] : worsened by walking [NSAIDs] : relieved by nonsteroidal anti-inflammatory drugs [Rest] : relieved by rest [de-identified] : 46 year old female here for f/u of right knee pain. She is s/p right knee arthroscopy from 2003 after ACL injury to the right knee during her teens. The patient is currently scheduled for a right TKA on 1/15/2021. She has completed presurgical testing. She is in a methadone clinic by Kirsten Chowdhury and is receiving a dosage of 75. She describes pain as sharp and throbbing. Pain is worse with standing, navigating stairs, squatting, and when exercising. She is using compression wrap for support and pain relief. She reports medical history of endocarditis. She reports history of ulcers on her bilateral lower extremities. She does report current healing wound on left lower extremity. She reports history of pulmonary embolism. She is now able to stop taking blood thinners Patient takes Gabapentin with relief of pain. She finished treatment with methadone several months ago; however, she started taking methadone again recently for pain management. Pt states the last cortisone injection helped a lot.

## 2021-01-08 NOTE — RETURN TO WORK/SCHOOL
[FreeTextEntry1] : The patient is scheduled to have right knee replacement early in the morning on Friday, January 15, 2021.  Patient will be unable to report to her methadone clinic on Friday, January 15, 2021 or January 18, 2021 related to her surgery.

## 2021-01-08 NOTE — PROCEDURE
[de-identified] : I injected the patient's right knee today with cortisone.\par \par I discussed at length with the patient the planned steroid and lidocaine injection. The risks, benefits, convalescence and alternatives were reviewed. The possible side effects discussed included but were not limited to: pain, swelling, heat, bleeding, and redness. Symptoms are generally mild but if they are extensive then contact the office. Giving pain relievers by mouth such as NSAIDs or Tylenol can generally treat the reactions to steroid and lidocaine. Rare cases of infection have been noted. Rash, hives and itching may occur post injection. If you have muscle pain or cramps, flushing and or swelling of the face, rapid heart beat, nausea, dizziness, fever, chills, headache, difficulty breathing, swelling in the arms or legs, or have a prickly feeling of your skin, contact a health care provider immediately. Following this discussion, the knee was prepped with Alcohol and under sterile condition the 80 mg Depo-Medrol and 6 cc Lidocaine injection was performed with a 20 gauge needle through a superolateral injection site. The needle was introduced into the joint, aspiration was performed to ensure intra-articular placement and the medication was injected. Upon withdrawal of the needle the site was cleaned with alcohol and a band aid applied. The patient tolerated the injection well and there were no adverse effects. Post injection instructions included no strenuous activity for 24 hours, cryotherapy and if there are any adverse effects to contact the office. \par \par

## 2021-01-08 NOTE — END OF VISIT
[FreeTextEntry3] : I, Joshua Stephens, acted solely as a scribe for Dr. Yeison Sauer on this date 01/08/2021.

## 2021-01-08 NOTE — DISCUSSION/SUMMARY
[Surgical risks reviewed] : Surgical risks reviewed [de-identified] : 46 year old female with severe tricompartmental osteoarthritis of the right knee. Conservative therapy and surgical options discussed in detail with the patient. She is currently scheduled to have  the right TKA on 1/15/2021. She is high risk for surgery due to stiffness, a history of DVT, PE, and chance for relapse of previous narcotic addiction. Pt notes that she completed presurgical testing and has received clearance from her pulmonary vascular doctor. She is in a methadone clinic by Kirsten Chowdhury and is receiving a dosage of 75. Pt understands that she should not take the methadone the day of surgery. We provided a note to her methadone clinic stating that she won't be able to  the methadone on 1/15/2021 and 1/18/2021 because she will be in the hospital. Additionally, she notes that she has reduced the amount of cigarettes. She previously smoked a pack a day, but is now smoking 5 cigarettes a day. We highly encourage the pt to try to stop smoking prior to the surgery. Pt understands that she will need to stop taking the Eliquis 72 hours prior to surgery.\par \par The patient is a 46 year old individual with end stage arthritis of their right knee joint. Based upon the patient's continued symptoms and failure to respond to conservative treatment I have recommended a right knee replacement for this patient. A long discussion took place with the patient describing what a total joint replacement is and what the procedure would entail. A total knee model, similar to the implant that will be used during the operation, was utilized to demonstrate and to discuss the various bearing surfaces of the implants. The hospitalization and post-operative care and rehabilitation were also discussed. The use of perioperative antibiotics and DVT prophylaxis were discussed. The risk, benefits and alternatives to a surgical intervention were discussed at length with the patient. The patient was also advised of risks related to the medical comorbidities and elevated body mass index (BMI).  A lengthy discussion took place to review the most common complications including but not limited to: deep vein thrombosis, pulmonary embolus, heart attack, stroke, infection, wound breakdown, numbness, damage to nerves, tendon, muscles, arteries or other blood vessels, death and other possible complications from anesthesia. The patient was told that we will take steps to minimize these risks by using sterile technique, antibiotics and DVT prophylaxis when appropriate and follow the patient postoperatively in the office setting to monitor progress. The possibility of recurrent pain, no improvement in pain and actual worsening of pain were also discussed with the patient.\par The discharge plan of care focused on the patient going home following surgery.  The patient was encouraged to make the necessary arrangements to have someone stay with them when they are discharged home.  Following discharge, a home care nurse will visit the patient.  The home care nurse will open your home care case and request home physical therapy services.  Home physical therapy will commence following discharge provided it is appropriate and covered by the health insurance benefit plan. \par The benefits of surgery were discussed with the patient including the potential for improving his/her current clinical condition through operative intervention. Alternatives to surgical intervention including continued conservative management were also discussed in detail. All questions were answered to the satisfaction of the patient. The treatment plan of care, as well as a model of a total knee equivalent to the one that will be used for their total joint replacement, was shared with the patient.  The patient agreed to the plan of care as well as the use of implants in their total joint replacement.

## 2021-01-13 ENCOUNTER — APPOINTMENT (OUTPATIENT)
Dept: DISASTER EMERGENCY | Facility: CLINIC | Age: 46
End: 2021-01-13

## 2021-01-14 ENCOUNTER — TRANSCRIPTION ENCOUNTER (OUTPATIENT)
Age: 46
End: 2021-01-14

## 2021-01-14 LAB — SARS-COV-2 N GENE NPH QL NAA+PROBE: NOT DETECTED

## 2021-01-15 ENCOUNTER — TRANSCRIPTION ENCOUNTER (OUTPATIENT)
Age: 46
End: 2021-01-15

## 2021-01-15 ENCOUNTER — APPOINTMENT (OUTPATIENT)
Dept: ORTHOPEDIC SURGERY | Facility: HOSPITAL | Age: 46
End: 2021-01-15

## 2021-01-15 ENCOUNTER — INPATIENT (INPATIENT)
Facility: HOSPITAL | Age: 46
LOS: 0 days | Discharge: HOME | DRG: 470 | End: 2021-01-16
Attending: ORTHOPAEDIC SURGERY | Admitting: ORTHOPAEDIC SURGERY
Payer: COMMERCIAL

## 2021-01-15 VITALS
WEIGHT: 195.99 LBS | SYSTOLIC BLOOD PRESSURE: 125 MMHG | DIASTOLIC BLOOD PRESSURE: 78 MMHG | TEMPERATURE: 97 F | RESPIRATION RATE: 16 BRPM | HEIGHT: 68 IN | OXYGEN SATURATION: 100 % | HEART RATE: 78 BPM

## 2021-01-15 DIAGNOSIS — F17.200 NICOTINE DEPENDENCE, UNSPECIFIED, UNCOMPLICATED: ICD-10-CM

## 2021-01-15 DIAGNOSIS — F11.20 OPIOID DEPENDENCE, UNCOMPLICATED: ICD-10-CM

## 2021-01-15 DIAGNOSIS — Z86.718 PERSONAL HISTORY OF OTHER VENOUS THROMBOSIS AND EMBOLISM: ICD-10-CM

## 2021-01-15 DIAGNOSIS — Z98.890 OTHER SPECIFIED POSTPROCEDURAL STATES: Chronic | ICD-10-CM

## 2021-01-15 DIAGNOSIS — M25.561 PAIN IN RIGHT KNEE: ICD-10-CM

## 2021-01-15 DIAGNOSIS — M17.11 UNILATERAL PRIMARY OSTEOARTHRITIS, RIGHT KNEE: ICD-10-CM

## 2021-01-15 DIAGNOSIS — I10 ESSENTIAL (PRIMARY) HYPERTENSION: ICD-10-CM

## 2021-01-15 DIAGNOSIS — R33.9 RETENTION OF URINE, UNSPECIFIED: ICD-10-CM

## 2021-01-15 DIAGNOSIS — Z86.79 PERSONAL HISTORY OF OTHER DISEASES OF THE CIRCULATORY SYSTEM: ICD-10-CM

## 2021-01-15 DIAGNOSIS — J44.9 CHRONIC OBSTRUCTIVE PULMONARY DISEASE, UNSPECIFIED: ICD-10-CM

## 2021-01-15 LAB
ABO RH CONFIRMATION: SIGNIFICANT CHANGE UP
GLUCOSE BLDC GLUCOMTR-MCNC: 127 MG/DL — HIGH (ref 70–99)
GLUCOSE BLDC GLUCOMTR-MCNC: 139 MG/DL — HIGH (ref 70–99)
GLUCOSE BLDC GLUCOMTR-MCNC: 96 MG/DL — SIGNIFICANT CHANGE UP (ref 70–99)

## 2021-01-15 PROCEDURE — 27447 TOTAL KNEE ARTHROPLASTY: CPT | Mod: AS,RT

## 2021-01-15 PROCEDURE — 20985 CPTR-ASST DIR MS PX: CPT

## 2021-01-15 PROCEDURE — 99222 1ST HOSP IP/OBS MODERATE 55: CPT

## 2021-01-15 PROCEDURE — 73560 X-RAY EXAM OF KNEE 1 OR 2: CPT | Mod: 26,RT

## 2021-01-15 PROCEDURE — 27447 TOTAL KNEE ARTHROPLASTY: CPT | Mod: RT

## 2021-01-15 RX ORDER — DEXTROAMPHETAMINE SACCHARATE, AMPHETAMINE ASPARTATE, DEXTROAMPHETAMINE SULFATE AND AMPHETAMINE SULFATE 1.875; 1.875; 1.875; 1.875 MG/1; MG/1; MG/1; MG/1
20 TABLET ORAL DAILY
Refills: 0 | Status: DISCONTINUED | OUTPATIENT
Start: 2021-01-15 | End: 2021-01-16

## 2021-01-15 RX ORDER — SODIUM CHLORIDE 9 MG/ML
3 INJECTION INTRAMUSCULAR; INTRAVENOUS; SUBCUTANEOUS EVERY 8 HOURS
Refills: 0 | Status: DISCONTINUED | OUTPATIENT
Start: 2021-01-15 | End: 2021-01-15

## 2021-01-15 RX ORDER — ACETAMINOPHEN 500 MG
975 TABLET ORAL EVERY 8 HOURS
Refills: 0 | Status: DISCONTINUED | OUTPATIENT
Start: 2021-01-16 | End: 2021-01-16

## 2021-01-15 RX ORDER — CEFAZOLIN SODIUM 1 G
2000 VIAL (EA) INJECTION
Refills: 0 | Status: COMPLETED | OUTPATIENT
Start: 2021-01-15 | End: 2021-01-15

## 2021-01-15 RX ORDER — METHADONE HYDROCHLORIDE 40 MG/1
75 TABLET ORAL DAILY
Refills: 0 | Status: DISCONTINUED | OUTPATIENT
Start: 2021-01-16 | End: 2021-01-16

## 2021-01-15 RX ORDER — SODIUM CHLORIDE 9 MG/ML
1000 INJECTION, SOLUTION INTRAVENOUS
Refills: 0 | Status: DISCONTINUED | OUTPATIENT
Start: 2021-01-15 | End: 2021-01-15

## 2021-01-15 RX ORDER — OXYCODONE HYDROCHLORIDE 5 MG/1
10 TABLET ORAL
Refills: 0 | Status: DISCONTINUED | OUTPATIENT
Start: 2021-01-15 | End: 2021-01-16

## 2021-01-15 RX ORDER — METHADONE HYDROCHLORIDE 40 MG/1
75 TABLET ORAL ONCE
Refills: 0 | Status: DISCONTINUED | OUTPATIENT
Start: 2021-01-15 | End: 2021-01-15

## 2021-01-15 RX ORDER — SENNA PLUS 8.6 MG/1
2 TABLET ORAL AT BEDTIME
Refills: 0 | Status: DISCONTINUED | OUTPATIENT
Start: 2021-01-15 | End: 2021-01-16

## 2021-01-15 RX ORDER — PANTOPRAZOLE SODIUM 20 MG/1
40 TABLET, DELAYED RELEASE ORAL
Refills: 0 | Status: DISCONTINUED | OUTPATIENT
Start: 2021-01-15 | End: 2021-01-16

## 2021-01-15 RX ORDER — OXYCODONE HYDROCHLORIDE 5 MG/1
5 TABLET ORAL
Refills: 0 | Status: DISCONTINUED | OUTPATIENT
Start: 2021-01-15 | End: 2021-01-15

## 2021-01-15 RX ORDER — OXYCODONE HYDROCHLORIDE 5 MG/1
15 TABLET ORAL
Refills: 0 | Status: DISCONTINUED | OUTPATIENT
Start: 2021-01-15 | End: 2021-01-16

## 2021-01-15 RX ORDER — ONDANSETRON 8 MG/1
4 TABLET, FILM COATED ORAL EVERY 6 HOURS
Refills: 0 | Status: DISCONTINUED | OUTPATIENT
Start: 2021-01-15 | End: 2021-01-16

## 2021-01-15 RX ORDER — GABAPENTIN 400 MG/1
300 CAPSULE ORAL THREE TIMES A DAY
Refills: 0 | Status: DISCONTINUED | OUTPATIENT
Start: 2021-01-15 | End: 2021-01-16

## 2021-01-15 RX ORDER — ALBUTEROL 90 UG/1
1 AEROSOL, METERED ORAL EVERY 6 HOURS
Refills: 0 | Status: DISCONTINUED | OUTPATIENT
Start: 2021-01-15 | End: 2021-01-16

## 2021-01-15 RX ORDER — ONDANSETRON 8 MG/1
4 TABLET, FILM COATED ORAL ONCE
Refills: 0 | Status: DISCONTINUED | OUTPATIENT
Start: 2021-01-15 | End: 2021-01-15

## 2021-01-15 RX ORDER — HYDROMORPHONE HYDROCHLORIDE 2 MG/ML
4 INJECTION INTRAMUSCULAR; INTRAVENOUS; SUBCUTANEOUS EVERY 4 HOURS
Refills: 0 | Status: DISCONTINUED | OUTPATIENT
Start: 2021-01-15 | End: 2021-01-15

## 2021-01-15 RX ORDER — SODIUM CHLORIDE 9 MG/ML
1000 INJECTION INTRAMUSCULAR; INTRAVENOUS; SUBCUTANEOUS
Refills: 0 | Status: DISCONTINUED | OUTPATIENT
Start: 2021-01-15 | End: 2021-01-16

## 2021-01-15 RX ORDER — SODIUM CHLORIDE 9 MG/ML
500 INJECTION INTRAMUSCULAR; INTRAVENOUS; SUBCUTANEOUS
Refills: 0 | Status: DISCONTINUED | OUTPATIENT
Start: 2021-01-15 | End: 2021-01-16

## 2021-01-15 RX ORDER — CEPHALEXIN 500 MG
500 CAPSULE ORAL
Refills: 0 | Status: DISCONTINUED | OUTPATIENT
Start: 2021-01-16 | End: 2021-01-16

## 2021-01-15 RX ORDER — OXYCODONE HYDROCHLORIDE 5 MG/1
10 TABLET ORAL
Refills: 0 | Status: DISCONTINUED | OUTPATIENT
Start: 2021-01-15 | End: 2021-01-15

## 2021-01-15 RX ORDER — DIAZEPAM 5 MG
10 TABLET ORAL EVERY 8 HOURS
Refills: 0 | Status: DISCONTINUED | OUTPATIENT
Start: 2021-01-15 | End: 2021-01-16

## 2021-01-15 RX ORDER — APREPITANT 80 MG/1
40 CAPSULE ORAL ONCE
Refills: 0 | Status: COMPLETED | OUTPATIENT
Start: 2021-01-15 | End: 2021-01-15

## 2021-01-15 RX ORDER — METHADONE HYDROCHLORIDE 40 MG/1
75 TABLET ORAL
Qty: 0 | Refills: 0 | DISCHARGE

## 2021-01-15 RX ORDER — TIOTROPIUM BROMIDE 18 UG/1
1 CAPSULE ORAL; RESPIRATORY (INHALATION) DAILY
Refills: 0 | Status: DISCONTINUED | OUTPATIENT
Start: 2021-01-15 | End: 2021-01-16

## 2021-01-15 RX ORDER — HYDRALAZINE HCL 50 MG
10 TABLET ORAL ONCE
Refills: 0 | Status: COMPLETED | OUTPATIENT
Start: 2021-01-15 | End: 2021-01-15

## 2021-01-15 RX ORDER — ACETAMINOPHEN 500 MG
1000 TABLET ORAL
Refills: 0 | Status: COMPLETED | OUTPATIENT
Start: 2021-01-15 | End: 2021-01-15

## 2021-01-15 RX ORDER — CEFAZOLIN SODIUM 1 G
2000 VIAL (EA) INJECTION ONCE
Refills: 0 | Status: DISCONTINUED | OUTPATIENT
Start: 2021-01-15 | End: 2021-01-15

## 2021-01-15 RX ORDER — TRANEXAMIC ACID 100 MG/ML
1000 INJECTION, SOLUTION INTRAVENOUS ONCE
Refills: 0 | Status: DISCONTINUED | OUTPATIENT
Start: 2021-01-15 | End: 2021-01-15

## 2021-01-15 RX ORDER — KETOROLAC TROMETHAMINE 30 MG/ML
15 SYRINGE (ML) INJECTION EVERY 6 HOURS
Refills: 0 | Status: DISCONTINUED | OUTPATIENT
Start: 2021-01-15 | End: 2021-01-16

## 2021-01-15 RX ORDER — MAGNESIUM HYDROXIDE 400 MG/1
30 TABLET, CHEWABLE ORAL DAILY
Refills: 0 | Status: DISCONTINUED | OUTPATIENT
Start: 2021-01-15 | End: 2021-01-16

## 2021-01-15 RX ORDER — HYDROMORPHONE HYDROCHLORIDE 2 MG/ML
1 INJECTION INTRAMUSCULAR; INTRAVENOUS; SUBCUTANEOUS
Refills: 0 | Status: DISCONTINUED | OUTPATIENT
Start: 2021-01-15 | End: 2021-01-15

## 2021-01-15 RX ORDER — APIXABAN 2.5 MG/1
2.5 TABLET, FILM COATED ORAL
Refills: 0 | Status: DISCONTINUED | OUTPATIENT
Start: 2021-01-16 | End: 2021-01-16

## 2021-01-15 RX ORDER — METHOCARBAMOL 500 MG/1
750 TABLET, FILM COATED ORAL EVERY 8 HOURS
Refills: 0 | Status: DISCONTINUED | OUTPATIENT
Start: 2021-01-15 | End: 2021-01-16

## 2021-01-15 RX ORDER — ACETAMINOPHEN 500 MG
975 TABLET ORAL ONCE
Refills: 0 | Status: COMPLETED | OUTPATIENT
Start: 2021-01-15 | End: 2021-01-15

## 2021-01-15 RX ORDER — DIAZEPAM 5 MG
1 TABLET ORAL
Qty: 0 | Refills: 0 | DISCHARGE

## 2021-01-15 RX ORDER — DEXTROAMPHETAMINE SACCHARATE, AMPHETAMINE ASPARTATE, DEXTROAMPHETAMINE SULFATE AND AMPHETAMINE SULFATE 1.875; 1.875; 1.875; 1.875 MG/1; MG/1; MG/1; MG/1
0 TABLET ORAL
Qty: 0 | Refills: 0 | DISCHARGE

## 2021-01-15 RX ADMIN — Medication 15 MILLIGRAM(S): at 12:06

## 2021-01-15 RX ADMIN — HYDROMORPHONE HYDROCHLORIDE 1 MILLIGRAM(S): 2 INJECTION INTRAMUSCULAR; INTRAVENOUS; SUBCUTANEOUS at 10:52

## 2021-01-15 RX ADMIN — OXYCODONE HYDROCHLORIDE 5 MILLIGRAM(S): 5 TABLET ORAL at 12:14

## 2021-01-15 RX ADMIN — Medication 10 MILLIGRAM(S): at 12:02

## 2021-01-15 RX ADMIN — SODIUM CHLORIDE 100 MILLILITER(S): 9 INJECTION INTRAMUSCULAR; INTRAVENOUS; SUBCUTANEOUS at 18:37

## 2021-01-15 RX ADMIN — Medication 15 MILLIGRAM(S): at 18:39

## 2021-01-15 RX ADMIN — SODIUM CHLORIDE 500 MILLILITER(S): 9 INJECTION INTRAMUSCULAR; INTRAVENOUS; SUBCUTANEOUS at 10:52

## 2021-01-15 RX ADMIN — METHADONE HYDROCHLORIDE 75 MILLIGRAM(S): 40 TABLET ORAL at 18:37

## 2021-01-15 RX ADMIN — HYDROMORPHONE HYDROCHLORIDE 1 MILLIGRAM(S): 2 INJECTION INTRAMUSCULAR; INTRAVENOUS; SUBCUTANEOUS at 11:32

## 2021-01-15 RX ADMIN — GABAPENTIN 300 MILLIGRAM(S): 400 CAPSULE ORAL at 15:36

## 2021-01-15 RX ADMIN — OXYCODONE HYDROCHLORIDE 10 MILLIGRAM(S): 5 TABLET ORAL at 15:36

## 2021-01-15 RX ADMIN — Medication 100 MILLIGRAM(S): at 23:38

## 2021-01-15 RX ADMIN — SODIUM CHLORIDE 75 MILLILITER(S): 9 INJECTION, SOLUTION INTRAVENOUS at 12:06

## 2021-01-15 RX ADMIN — GABAPENTIN 300 MILLIGRAM(S): 400 CAPSULE ORAL at 21:28

## 2021-01-15 RX ADMIN — Medication 400 MILLIGRAM(S): at 18:39

## 2021-01-15 RX ADMIN — Medication 15 MILLIGRAM(S): at 23:38

## 2021-01-15 RX ADMIN — HYDROMORPHONE HYDROCHLORIDE 1 MILLIGRAM(S): 2 INJECTION INTRAMUSCULAR; INTRAVENOUS; SUBCUTANEOUS at 11:47

## 2021-01-15 RX ADMIN — Medication 100 MILLIGRAM(S): at 16:53

## 2021-01-15 RX ADMIN — SENNA PLUS 2 TABLET(S): 8.6 TABLET ORAL at 21:28

## 2021-01-15 RX ADMIN — OXYCODONE HYDROCHLORIDE 10 MILLIGRAM(S): 5 TABLET ORAL at 21:32

## 2021-01-15 RX ADMIN — SODIUM CHLORIDE 100 MILLILITER(S): 9 INJECTION INTRAMUSCULAR; INTRAVENOUS; SUBCUTANEOUS at 16:52

## 2021-01-15 RX ADMIN — Medication 975 MILLIGRAM(S): at 06:40

## 2021-01-15 RX ADMIN — HYDROMORPHONE HYDROCHLORIDE 1 MILLIGRAM(S): 2 INJECTION INTRAMUSCULAR; INTRAVENOUS; SUBCUTANEOUS at 11:08

## 2021-01-15 RX ADMIN — APREPITANT 40 MILLIGRAM(S): 80 CAPSULE ORAL at 06:40

## 2021-01-15 RX ADMIN — Medication 10 MILLIGRAM(S): at 21:28

## 2021-01-15 NOTE — CONSULT NOTE ADULT - CONSULT REASON
medical comanagement requested
HPI:  44 yo F PMH of COPD, DVT/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 Pt had right knee arthroscopy in 2003 after ACL injury to the right knee during her teens. Pt describes her pain as chronic, excruciating and aggravated by standing, navigating stairs, squatting, and exercising. She reports medical history of endocarditis. Pt reports Hx of ulcers on her bilateral lower extremities. She has current healed wounds on left and right LE. She finished treatment with methadone several months ago; however, she started taking methadone again recently for pain management. She states the symptoms are worsening. Pain levels include an average pain level of 5/10, a minimum pain level of 3/10, and a maximum pain level of 10/10. Modifying factors: worsened by walking. Relieving factors include NSAIDs, steroid injections, rest, and wearing a compression wrap. Presents today for preop assessment prior to right TKR w/Dr Sauer on 1/15 (28 Dec 2020 10:34)    Pain service:  The orthopedic team requests assistance with pain management as the patient is opioid tolerant.  She is in a methadone treatment program.  I stop reveals prescriptions for Adderall and Valium 2-3 times daily at home.

## 2021-01-15 NOTE — CONSULT NOTE ADULT - ASSESSMENT
46-year-old female on methadone maintenance, now operative day for right total knee replacement.  She was found AA&Ox3, NAD, supine on stretcher in PACU.  Discussed continuing her baseline methadone postoperatively, once verified by surgical team.  She will also require short-acting opioids for breakthrough pain and nonopioid adjuncts.  The patient verbalizes understanding and agreement with plan.  
44 yo F PMH of COPD, DVT/PE (on eliquis), current smoker, depression and anxiety, former IVDA, known end stage OA of right knee, c/o right knee pain. Pt had right knee arthroscopy in 2003 after ACL injury to the right knee during her teens She reports medical history of endocarditis.Now S/P right TKR w/Dr Sauer POD#0.

## 2021-01-15 NOTE — CONSULT NOTE ADULT - PROBLEM SELECTOR RECOMMENDATION 2
1.  Ortho team to verify and order home dose of Methadone with prescribing clinic  2.  Maximize use of nonopioid adjuncts  - Gabapentin 300 mg p.o. 3 times daily for neuropathic pain.  3.   The patient is to follow-up with her outpatient methadone clinic post facility discharge.
As per patient BP has been borderline, not currently on medications. Pain contributing to elevated readings. Hydralazine 10 mg IV ordered.   Manage pain

## 2021-01-15 NOTE — CONSULT NOTE ADULT - SUBJECTIVE AND OBJECTIVE BOX
VERBAL REPORT: "I think I should do okay, it is just 1 night stay."  The patient describes chronic right knee pain.  She reports use of Methadone 75 mg  outpatient.  She goes to a clinic 4 times weekly.  She adds that she was given additional doses in anticipation of her surgery, which she left at home. She reports use of Valium and Adderall for depression and anxiety, not pain-related diagnosis.  PAIN SCORE: 4/10                  SCALE USED: VNRS    Allergies  Bactrim (Hives; Rash)  latex (Rash)  sulfa drugs (Rash)  vancomycin (Red Man Synd; Rash)      PAST MEDICAL & SURGICAL HISTORY:  H/O endocarditis  Personal history of DVT (deep vein thrombosis)  on Eliquis  Anxiety and depression  COPD without exacerbation  Current smoker  Osteoarthritis of right knee  History of arthroscopy  right knee 1995    MEDICATIONS  (STANDING):    MEDICATIONS  (PRN):    PHYSICAL EXAM:  GENERAL: NAD, well-developed  HEAD:  Atraumatic, Normocephalic  EYES: EOMI, PERRLA, conjunctiva and sclera clear  NERVOUS SYSTEM:  Alert & Oriented X3, Good concentration; Motor Strength 5/5 B/L upper and lower extremities  CHEST/LUNG: Clear speech, no SOB evident at rest  ABDOMEN: Soft, Nontender, Nondistended; Bowel sounds present  EXTREMITIES: No clubbing, cyanosis, or edema    Vital Signs Last 24 Hrs  T(C): 36.2 (15 Jose 2021 06:08), Max: 36.2 (15 Jose 2021 06:08)  T(F): 97.1 (15 Jose 2021 06:08), Max: 97.1 (15 Jose 2021 06:08)  HR: 78 (15 Jose 2021 06:08) (78 - 78)  BP: 125/78 (15 Jose 2021 06:08) (125/78 - 125/78)  BP(mean): --  RR: 16 (15 Jose 2021 06:08) (16 - 16)  SpO2: 100% (15 Jose 2021 06:08) (100% - 100%)                Pain Service   Text Page via [employer]  PIN: [phone]

## 2021-01-15 NOTE — CONSULT NOTE ADULT - SUBJECTIVE AND OBJECTIVE BOX
HPI:  44 yo F PMH of COPD, DVT/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 Pt had right knee arthroscopy in 2003 after ACL injury to the right knee during her teens. Pt describes her pain as chronic, excruciating and aggravated by standing, navigating stairs, squatting, and exercising. She reports medical history of endocarditis. Pt reports Hx of ulcers on her bilateral lower extremities. She has current healed wounds on left and right LE. She finished treatment with methadone several months ago; however, she started taking methadone again recently for pain management. She states the symptoms are worsening. Pain levels include an average pain level of 5/10, a minimum pain level of 3/10, and a maximum pain level of 10/10. Modifying factors: worsened by walking. Relieving factors include NSAIDs, steroid injections, rest, and wearing a compression wrap. Now S/P right TKR w/Dr Nett POD#0.             PAST MEDICAL & SURGICAL HISTORY:  H/O endocarditis    Personal history of DVT (deep vein thrombosis)  on Eliquis    Anxiety and depression    COPD without exacerbation    Current smoker    Osteoarthritis of right knee    History of arthroscopy  right knee 1995        MEDICATIONS  (STANDING):  acetaminophen  IVPB .. 1000 milliGRAM(s) IV Intermittent <User Schedule>  ceFAZolin   IVPB 2000 milliGRAM(s) IV Intermittent <User Schedule>  ketorolac   Injectable 15 milliGRAM(s) IV Push every 6 hours  lactated ringers. 1000 milliLiter(s) (75 mL/Hr) IV Continuous <Continuous>  sodium chloride 0.9%. 500 milliLiter(s) (500 mL/Hr) IV Continuous <Continuous>    MEDICATIONS  (PRN):  HYDROmorphone  Injectable 1 milliGRAM(s) IV Push every 10 minutes PRN Severe Pain (7 - 10)  ondansetron Injectable 4 milliGRAM(s) IV Push once PRN Nausea and/or Vomiting      Allergies    Bactrim (Hives; Rash)  latex (Rash)  sulfa drugs (Rash)  vancomycin (Red Man Synd; Rash)    Intolerances        SOCIAL HISTORY:  Current smoker  Social ETOH    FAMILY HISTORY:  FH: type 2 diabetes  parents        Vital Signs Last 24 Hrs  T(C): 36.4 (15 Jose 2021 10:42), Max: 36.4 (15 Jose 2021 10:42)  T(F): 97.5 (15 Jose 2021 10:42), Max: 97.5 (15 Jose 2021 10:42)  HR: 79 (15 Jose 2021 11:27) (73 - 83)  BP: 174/93 (15 Jose 2021 11:27) (125/78 - 174/98)  BP(mean): --  RR: 18 (15 Jose 2021 11:27) (14 - 26)  SpO2: 100% (15 Jose 2021 11:27) (100% - 100%)    LABS:                  RADIOLOGY & ADDITIONAL STUDIES: HPI:  44 yo F PMH of COPD, DVT/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 Pt had right knee arthroscopy in 2003 after ACL injury to the right knee during her teens. Pt describes her pain as chronic, excruciating and aggravated by standing, navigating stairs, squatting, and exercising. She reports medical history of endocarditis. Pt reports Hx of ulcers on her bilateral lower extremities. She has current healed wounds on left and right LE. She finished treatment with methadone several months ago; however, she started taking methadone again recently for pain management. She states the symptoms are worsening. Pain levels include an average pain level of 5/10, a minimum pain level of 3/10, and a maximum pain level of 10/10. Modifying factors: worsened by walking. Relieving factors include NSAIDs, steroid injections, rest, and wearing a compression wrap. Now S/P right TKR w/Dr Nett POD#0.             PAST MEDICAL & SURGICAL HISTORY:  H/O endocarditis    Personal history of DVT (deep vein thrombosis)  on Eliquis    Anxiety and depression    COPD without exacerbation    Current smoker    Osteoarthritis of right knee    History of arthroscopy  right knee 1995        MEDICATIONS  (STANDING):  acetaminophen  IVPB .. 1000 milliGRAM(s) IV Intermittent <User Schedule>  ceFAZolin   IVPB 2000 milliGRAM(s) IV Intermittent <User Schedule>  ketorolac   Injectable 15 milliGRAM(s) IV Push every 6 hours  lactated ringers. 1000 milliLiter(s) (75 mL/Hr) IV Continuous <Continuous>  sodium chloride 0.9%. 500 milliLiter(s) (500 mL/Hr) IV Continuous <Continuous>    MEDICATIONS  (PRN):  HYDROmorphone  Injectable 1 milliGRAM(s) IV Push every 10 minutes PRN Severe Pain (7 - 10)  ondansetron Injectable 4 milliGRAM(s) IV Push once PRN Nausea and/or Vomiting      Allergies    Bactrim (Hives; Rash)  latex (Rash)  sulfa drugs (Rash)  vancomycin (Red Man Synd; Rash)    Intolerances        SOCIAL HISTORY:  Current smoker  Social ETOH  Former Opioid dependence/IVDA- now on Methadone    FAMILY HISTORY:  FH: type 2 diabetes  parents        Vital Signs Last 24 Hrs  T(C): 36.4 (15 Jose 2021 10:42), Max: 36.4 (15 Jose 2021 10:42)  T(F): 97.5 (15 Jose 2021 10:42), Max: 97.5 (15 Jose 2021 10:42)  HR: 79 (15 Jose 2021 11:27) (73 - 83)  BP: 174/93 (15 Jose 2021 11:27) (125/78 - 174/98)  BP(mean): --  RR: 18 (15 Jose 2021 11:27) (14 - 26)  SpO2: 100% (15 Jose 2021 11:27) (100% - 100%)    ROS:    +Right knee pain  Constitutional, Eyes, ENT, Cardiovascular, Respiratory,  Gastrointestinal, Genitourinary, Musculoskeletal, Integumentary, Psychiatric, Endocrine, Heme/Lymph are otherwise negative.    PHYSICAL EXAM:    General: Well developed; well nourished; in no acute distress  Eyes: PERRL, EOM intact; conjunctiva and sclera clear  Head: Normocephalic; atraumatic  ENMT: No nasal discharge; airway clear  Neck: Supple; non tender; no JVD  Respiratory: Coarse BS B/L No wheezes, rales or rhonchi  Cardiovascular: Regular rate and rhythm. S1 and S2 Normal;  +3/6 murmur  Gastrointestinal: Soft non-tender non-distended; Normal bowel sounds; No hepatosplenomegaly  Extremities: Right ACE wrap in place.  No clubbing, cyanosis or edema  Vascular: Peripheral pulses palpable 2+ bilaterally  Neurological: Alert and oriented x4  Skin: Warm and dry. No acute rash  Psychiatric: Cooperative and appropriate      LABS:                  RADIOLOGY & ADDITIONAL STUDIES: HPI:  44 yo F PMH of COPD, DVT/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 Pt had right knee arthroscopy in 2003 after ACL injury to the right knee during her teens. Pt describes her pain as chronic, excruciating and aggravated by standing, navigating stairs, squatting, and exercising. She reports medical history of endocarditis. Pt reports Hx of ulcers on her bilateral lower extremities. She has current healed wounds on left and right LE. She finished treatment with methadone several months ago; however, she started taking methadone again recently for pain management. She states the symptoms are worsening. Pain levels include an average pain level of 5/10, a minimum pain level of 3/10, and a maximum pain level of 10/10. Modifying factors: worsened by walking. Relieving factors include NSAIDs, steroid injections, rest, and wearing a compression wrap. Now S/P right TKR w/Dr Nett POD#0. RN reports Urinary retention 650 cc's.             PAST MEDICAL & SURGICAL HISTORY:  H/O endocarditis    Personal history of DVT (deep vein thrombosis)  on Eliquis    Anxiety and depression    COPD without exacerbation    Current smoker    Osteoarthritis of right knee    History of arthroscopy  right knee 1995        MEDICATIONS  (STANDING):  acetaminophen  IVPB .. 1000 milliGRAM(s) IV Intermittent <User Schedule>  ceFAZolin   IVPB 2000 milliGRAM(s) IV Intermittent <User Schedule>  ketorolac   Injectable 15 milliGRAM(s) IV Push every 6 hours  lactated ringers. 1000 milliLiter(s) (75 mL/Hr) IV Continuous <Continuous>  sodium chloride 0.9%. 500 milliLiter(s) (500 mL/Hr) IV Continuous <Continuous>    MEDICATIONS  (PRN):  HYDROmorphone  Injectable 1 milliGRAM(s) IV Push every 10 minutes PRN Severe Pain (7 - 10)  ondansetron Injectable 4 milliGRAM(s) IV Push once PRN Nausea and/or Vomiting      Allergies    Bactrim (Hives; Rash)  latex (Rash)  sulfa drugs (Rash)  vancomycin (Red Man Synd; Rash)    Intolerances        SOCIAL HISTORY:  Current smoker  Social ETOH  Former Opioid dependence/IVDA- now on Methadone    FAMILY HISTORY:  FH: type 2 diabetes  parents        Vital Signs Last 24 Hrs  T(C): 36.4 (15 Jose 2021 10:42), Max: 36.4 (15 Jose 2021 10:42)  T(F): 97.5 (15 Jose 2021 10:42), Max: 97.5 (15 Jose 2021 10:42)  HR: 79 (15 Jose 2021 11:27) (73 - 83)  BP: 174/93 (15 Jose 2021 11:27) (125/78 - 174/98)  BP(mean): --  RR: 18 (15 Jose 2021 11:27) (14 - 26)  SpO2: 100% (15 Jose 2021 11:27) (100% - 100%)    ROS:    +Right knee pain  Constitutional, Eyes, ENT, Cardiovascular, Respiratory,  Gastrointestinal, Genitourinary, Musculoskeletal, Integumentary, Psychiatric, Endocrine, Heme/Lymph are otherwise negative.    PHYSICAL EXAM:    General: Well developed; well nourished; in no acute distress  Eyes: PERRL, EOM intact; conjunctiva and sclera clear  Head: Normocephalic; atraumatic  ENMT: No nasal discharge; airway clear  Neck: Supple; non tender; no JVD  Respiratory: Coarse BS B/L No wheezes, rales or rhonchi  Cardiovascular: Regular rate and rhythm. S1 and S2 Normal;  +3/6 murmur  Gastrointestinal: Soft non-tender non-distended; Normal bowel sounds; No hepatosplenomegaly  Extremities: Right ACE wrap in place.  No clubbing, cyanosis or edema  Vascular: Peripheral pulses palpable 2+ bilaterally  Neurological: Alert and oriented x4  Skin: Warm and dry. No acute rash  Psychiatric: Cooperative and appropriate      LABS:                  RADIOLOGY & ADDITIONAL STUDIES: CC: R knee chronic pain     HPI:  44 yo F PMH of COPD, DVT/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 Pt had right knee arthroscopy in 2003 after ACL injury to the right knee during her teens. Pt describes her pain as chronic, excruciating and aggravated by standing, navigating stairs, squatting, and exercising. She reports medical history of endocarditis. Pt reports Hx of ulcers on her bilateral lower extremities. She has current healed wounds on left and right LE. She finished treatment with methadone several months ago; however, she started taking methadone again recently for pain management. She states the symptoms are worsening. Pain levels include an average pain level of 5/10, a minimum pain level of 3/10, and a maximum pain level of 10/10. Modifying factors: worsened by walking. Relieving factors include NSAIDs, steroid injections, rest, and wearing a compression wrap. Now S/P right TKR w/Dr Nett POD#0. RN reports Urinary retention 650 cc's.             PAST MEDICAL & SURGICAL HISTORY:  H/O endocarditis    Personal history of DVT (deep vein thrombosis)  on Eliquis    Anxiety and depression    COPD without exacerbation    Current smoker    Osteoarthritis of right knee    History of arthroscopy  right knee 1995        MEDICATIONS  (STANDING):  acetaminophen  IVPB .. 1000 milliGRAM(s) IV Intermittent <User Schedule>  ceFAZolin   IVPB 2000 milliGRAM(s) IV Intermittent <User Schedule>  ketorolac   Injectable 15 milliGRAM(s) IV Push every 6 hours  lactated ringers. 1000 milliLiter(s) (75 mL/Hr) IV Continuous <Continuous>  sodium chloride 0.9%. 500 milliLiter(s) (500 mL/Hr) IV Continuous <Continuous>    MEDICATIONS  (PRN):  HYDROmorphone  Injectable 1 milliGRAM(s) IV Push every 10 minutes PRN Severe Pain (7 - 10)  ondansetron Injectable 4 milliGRAM(s) IV Push once PRN Nausea and/or Vomiting      Allergies    Bactrim (Hives; Rash)  latex (Rash)  sulfa drugs (Rash)  vancomycin (Red Man Synd; Rash)    Intolerances        SOCIAL HISTORY:  Current smoker  Social ETOH  Former Opioid dependence/IVDA- now on Methadone    FAMILY HISTORY:  FH: type 2 diabetes  parents        Vital Signs Last 24 Hrs  T(C): 36.4 (15 Jose 2021 10:42), Max: 36.4 (15 Jose 2021 10:42)  T(F): 97.5 (15 Jose 2021 10:42), Max: 97.5 (15 Jose 2021 10:42)  HR: 79 (15 Jose 2021 11:27) (73 - 83)  BP: 174/93 (15 Jose 2021 11:27) (125/78 - 174/98)  BP(mean): --  RR: 18 (15 Jose 2021 11:27) (14 - 26)  SpO2: 100% (15 Jose 2021 11:27) (100% - 100%)    ROS:    +Right knee pain  Constitutional, Eyes, ENT, Cardiovascular, Respiratory,  Gastrointestinal, Genitourinary, Musculoskeletal, Integumentary, Psychiatric, Endocrine, Heme/Lymph are otherwise negative.    PHYSICAL EXAM:    General: Well developed; well nourished; in no acute distress  Eyes: PERRL, EOM intact; conjunctiva and sclera clear  Head: Normocephalic; atraumatic  ENMT: No nasal discharge; airway clear  Neck: Supple; non tender; no JVD  Respiratory: Coarse BS B/L No wheezes, rales or rhonchi  Cardiovascular: Regular rate and rhythm. S1 and S2 Normal;  +3/6 murmur  Gastrointestinal: Soft non-tender non-distended; Normal bowel sounds; No hepatosplenomegaly  Extremities: Right ACE wrap in place.  No clubbing, cyanosis or edema  Vascular: Peripheral pulses palpable 2+ bilaterally  Neurological: Alert and oriented x4  Skin: Warm and dry. No acute rash  Psychiatric: Cooperative and appropriate      LABS:                  RADIOLOGY & ADDITIONAL STUDIES:

## 2021-01-15 NOTE — CONSULT NOTE ADULT - PROBLEM SELECTOR RECOMMENDATION 9
S/P right TKR  Post op ABX as per SCIP  Pain management consult for pain control  DVT ppx/PT as per Ortho  IS Straight cath x 1  Bladder scan q 6 hrs, call MD/PA if >300

## 2021-01-15 NOTE — CONSULT NOTE ADULT - ATTENDING COMMENTS
Patient seen and examined , s/p R TKA , POD # 0 ,   tolerated procedure well ,   R knee dressing + , clean and dry .Postop noted with   postop urinary retention 650 ml ,   bladder scan / Straigh cath Q6 hrs for now   Above noted , reviewed with NP ( Yuliana )   Labs pending   < from: Xray Chest 2 Views PA/Lat (12.28.20 @ 12:17) >       EXAM:  XR CHEST PA LAT 2V                          PROCEDURE DATE:  12/28/2020          INTERPRETATION:  TECHNIQUE: 2 views of the chest.    COMPARISON: None    CLINICAL HISTORY: Shortness of Breath, current smoker, preop for right knee replacement    FINDINGS:    Frontal and lateral views of the chest demonstrates the lungs to be clear. The cardiomediastinal silhouette is normal. No acute osseous abnormalities. Consider CT chest as clinically warranted.    IMPRESSION:    No acute cardiopulmonary disease process.  ROSA MOCK MD; Attending Radiologist  This document has been electronically signed. Dec 28 2020  1:23PM  < end of copied text >    Current smoker - consider Nicotine patch   On Methadone - continue once dose confirmed .   Pain mgmt   Above noted , reviewed with NP ( Yuliana ) .   Agree with above ,   PT/OT,   follow labs   Dr Jarvis will take over in am . Patient seen and examined , s/p R TKA , POD # 0 ,   tolerated procedure well ,   R knee dressing + , clean and dry .Postop noted with   postop urinary retention 650 ml ,   bladder scan / Straight cath Q6 hrs for now   Above noted , reviewed with NP ( Yuliana )   Labs pending   < from: Xray Chest 2 Views PA/Lat (12.28.20 @ 12:17) >       EXAM:  XR CHEST PA LAT 2V                          PROCEDURE DATE:  12/28/2020          INTERPRETATION:  TECHNIQUE: 2 views of the chest.    COMPARISON: None    CLINICAL HISTORY: Shortness of Breath, current smoker, preop for right knee replacement    FINDINGS:    Frontal and lateral views of the chest demonstrates the lungs to be clear. The cardiomediastinal silhouette is normal. No acute osseous abnormalities. Consider CT chest as clinically warranted.    IMPRESSION:    No acute cardiopulmonary disease process.  ROSA MOCK MD; Attending Radiologist  This document has been electronically signed. Dec 28 2020  1:23PM  < end of copied text >    Current smoker - consider Nicotine patch   On Methadone - continue once dose confirmed .   Pain mgmt   Above noted , reviewed with NP ( Yuliana ) .   Agree with above ,   PT/OT,   follow labs   Dr Jarvis will take over in am .

## 2021-01-15 NOTE — PHARMACY COMMUNICATION NOTE - COMMENTS
Patient received 75 mg orally on 1/14 at [facility] tele# [phone] and has 4 bottles at home thru 1/18.  Provide discharge note for date and dose given prior to discharge.

## 2021-01-15 NOTE — CONSULT NOTE ADULT - PROBLEM SELECTOR RECOMMENDATION 9
For postoperative pain management:  1.  Tylenol 975 mg p.o. every 6-8 hours, ATC x3 days, for mild pain  2.  Celebrex 200 mg p.o. 1-2 times daily, for inflammation  3.  Oxycodone IR 5/10 mg p.o. q3hrs, PRN moderate severe pain, respectively  4.  Robaxin-750 milligrams p.o. 3 times daily for spasm

## 2021-01-15 NOTE — PHYSICAL THERAPY INITIAL EVALUATION ADULT - ADDITIONAL COMMENTS
pt reports she lives in a house with 4 steps to enter ( handrails ) with her mother. pt has 3 steps to handicap bathroom/shower with handrails. pt reports independence with all ADLs, no assistive device needed and driving prior to admission. pt owns a cane.

## 2021-01-16 ENCOUNTER — FORM ENCOUNTER (OUTPATIENT)
Age: 46
End: 2021-01-16

## 2021-01-16 ENCOUNTER — TRANSCRIPTION ENCOUNTER (OUTPATIENT)
Age: 46
End: 2021-01-16

## 2021-01-16 VITALS
SYSTOLIC BLOOD PRESSURE: 139 MMHG | TEMPERATURE: 98 F | RESPIRATION RATE: 16 BRPM | OXYGEN SATURATION: 95 % | HEART RATE: 79 BPM | DIASTOLIC BLOOD PRESSURE: 79 MMHG

## 2021-01-16 LAB
ANION GAP SERPL CALC-SCNC: 11 MMOL/L — SIGNIFICANT CHANGE UP (ref 5–17)
BUN SERPL-MCNC: 11 MG/DL — SIGNIFICANT CHANGE UP (ref 8–20)
CALCIUM SERPL-MCNC: 9 MG/DL — SIGNIFICANT CHANGE UP (ref 8.6–10.2)
CHLORIDE SERPL-SCNC: 101 MMOL/L — SIGNIFICANT CHANGE UP (ref 98–107)
CO2 SERPL-SCNC: 24 MMOL/L — SIGNIFICANT CHANGE UP (ref 22–29)
CREAT SERPL-MCNC: 0.62 MG/DL — SIGNIFICANT CHANGE UP (ref 0.5–1.3)
GLUCOSE SERPL-MCNC: 143 MG/DL — HIGH (ref 70–99)
HCT VFR BLD CALC: 35.2 % — SIGNIFICANT CHANGE UP (ref 34.5–45)
HGB BLD-MCNC: 11.4 G/DL — LOW (ref 11.5–15.5)
MCHC RBC-ENTMCNC: 29.4 PG — SIGNIFICANT CHANGE UP (ref 27–34)
MCHC RBC-ENTMCNC: 32.4 GM/DL — SIGNIFICANT CHANGE UP (ref 32–36)
MCV RBC AUTO: 90.7 FL — SIGNIFICANT CHANGE UP (ref 80–100)
PLATELET # BLD AUTO: 262 K/UL — SIGNIFICANT CHANGE UP (ref 150–400)
POTASSIUM SERPL-MCNC: 4.4 MMOL/L — SIGNIFICANT CHANGE UP (ref 3.5–5.3)
POTASSIUM SERPL-SCNC: 4.4 MMOL/L — SIGNIFICANT CHANGE UP (ref 3.5–5.3)
RBC # BLD: 3.88 M/UL — SIGNIFICANT CHANGE UP (ref 3.8–5.2)
RBC # FLD: 13.6 % — SIGNIFICANT CHANGE UP (ref 10.3–14.5)
SODIUM SERPL-SCNC: 136 MMOL/L — SIGNIFICANT CHANGE UP (ref 135–145)
WBC # BLD: 13.85 K/UL — HIGH (ref 3.8–10.5)
WBC # FLD AUTO: 13.85 K/UL — HIGH (ref 3.8–10.5)

## 2021-01-16 PROCEDURE — 73560 X-RAY EXAM OF KNEE 1 OR 2: CPT

## 2021-01-16 PROCEDURE — 80048 BASIC METABOLIC PNL TOTAL CA: CPT

## 2021-01-16 PROCEDURE — 97163 PT EVAL HIGH COMPLEX 45 MIN: CPT

## 2021-01-16 PROCEDURE — 85027 COMPLETE CBC AUTOMATED: CPT

## 2021-01-16 PROCEDURE — 97116 GAIT TRAINING THERAPY: CPT

## 2021-01-16 PROCEDURE — C1776: CPT

## 2021-01-16 PROCEDURE — 36415 COLL VENOUS BLD VENIPUNCTURE: CPT

## 2021-01-16 PROCEDURE — 94640 AIRWAY INHALATION TREATMENT: CPT

## 2021-01-16 PROCEDURE — C1713: CPT

## 2021-01-16 PROCEDURE — 82962 GLUCOSE BLOOD TEST: CPT

## 2021-01-16 PROCEDURE — 99232 SBSQ HOSP IP/OBS MODERATE 35: CPT

## 2021-01-16 RX ORDER — APIXABAN 2.5 MG/1
1 TABLET, FILM COATED ORAL
Qty: 0 | Refills: 0 | DISCHARGE

## 2021-01-16 RX ORDER — OXYCODONE HYDROCHLORIDE 5 MG/1
1 TABLET ORAL
Qty: 70 | Refills: 0
Start: 2021-01-16

## 2021-01-16 RX ORDER — SENNOSIDES/DOCUSATE SODIUM 8.6MG-50MG
2 TABLET ORAL
Qty: 30 | Refills: 0
Start: 2021-01-16

## 2021-01-16 RX ORDER — APIXABAN 2.5 MG/1
1 TABLET, FILM COATED ORAL
Qty: 10 | Refills: 0
Start: 2021-01-16 | End: 2021-01-20

## 2021-01-16 RX ADMIN — PANTOPRAZOLE SODIUM 40 MILLIGRAM(S): 20 TABLET, DELAYED RELEASE ORAL at 06:13

## 2021-01-16 RX ADMIN — OXYCODONE HYDROCHLORIDE 10 MILLIGRAM(S): 5 TABLET ORAL at 13:10

## 2021-01-16 RX ADMIN — GABAPENTIN 300 MILLIGRAM(S): 400 CAPSULE ORAL at 06:13

## 2021-01-16 RX ADMIN — Medication 975 MILLIGRAM(S): at 12:21

## 2021-01-16 RX ADMIN — APIXABAN 2.5 MILLIGRAM(S): 2.5 TABLET, FILM COATED ORAL at 06:13

## 2021-01-16 RX ADMIN — Medication 15 MILLIGRAM(S): at 06:14

## 2021-01-16 RX ADMIN — Medication 500 MILLIGRAM(S): at 06:13

## 2021-01-16 RX ADMIN — METHADONE HYDROCHLORIDE 75 MILLIGRAM(S): 40 TABLET ORAL at 12:20

## 2021-01-16 RX ADMIN — GABAPENTIN 300 MILLIGRAM(S): 400 CAPSULE ORAL at 12:21

## 2021-01-16 RX ADMIN — TIOTROPIUM BROMIDE 1 CAPSULE(S): 18 CAPSULE ORAL; RESPIRATORY (INHALATION) at 08:22

## 2021-01-16 RX ADMIN — Medication 500 MILLIGRAM(S): at 12:19

## 2021-01-16 RX ADMIN — Medication 975 MILLIGRAM(S): at 06:13

## 2021-01-16 RX ADMIN — OXYCODONE HYDROCHLORIDE 10 MILLIGRAM(S): 5 TABLET ORAL at 06:14

## 2021-01-16 NOTE — DISCHARGE NOTE PROVIDER - CARE PROVIDER_API CALL
Yeison Sauer)  Orthopaedic Surgery  [address]  [place] [address]  Phone: [phone]  Fax: [phone]  Follow Up Time:

## 2021-01-16 NOTE — PROGRESS NOTE ADULT - SUBJECTIVE AND OBJECTIVE BOX
AMBER WILD    [id number]    History: 46y Female is status post right total knee arthroplasty, POD # 1. Patient is doing well and is comfortable. The patient's pain is controlled using the prescribed pain medications. The patient is participating in physical therapy. Denies nausea, vomiting, chest pain, shortness of breath, abdominal pain or fever. No new complaints.    Vital Signs Last 24 Hrs  T(C): 36.8 (16 Jan 2021 04:34), Max: 36.8 (15 Jose 2021 19:51)  T(F): 98.3 (16 Jan 2021 04:34), Max: 98.3 (15 Jose 2021 19:51)  HR: 79 (16 Jan 2021 04:34) (64 - 100)  BP: 142/89 (16 Jan 2021 04:34) (136/88 - 187/95)  BP(mean): --  RR: 19 (16 Jan 2021 04:34) (14 - 26)  SpO2: 96% (16 Jan 2021 04:34) (93% - 100%)                        11.4   13.85 )-----------( 262      ( 16 Jan 2021 05:58 )             35.2     01-16    136  |  101  |  11.0  ----------------------------<  143<H>  4.4   |  24.0  |  0.62    Ca    9.0      16 Jan 2021 05:58    MEDICATIONS  (STANDING):  acetaminophen   Tablet .. 975 milliGRAM(s) Oral every 8 hours  amphetamine/dextroamphetamine 20 milliGRAM(s) Oral daily  apixaban 2.5 milliGRAM(s) Oral two times a day  cephalexin 500 milliGRAM(s) Oral four times a day  diazepam    Tablet 10 milliGRAM(s) Oral every 8 hours  gabapentin 300 milliGRAM(s) Oral three times a day  methadone    Tablet 75 milliGRAM(s) Oral daily  pantoprazole    Tablet 40 milliGRAM(s) Oral before breakfast  senna 2 Tablet(s) Oral at bedtime  sodium chloride 0.9%. 500 milliLiter(s) (500 mL/Hr) IV Continuous <Continuous>  sodium chloride 0.9%. 1000 milliLiter(s) (100 mL/Hr) IV Continuous <Continuous>  tiotropium 18 MICROgram(s) Capsule 1 Capsule(s) Inhalation daily    MEDICATIONS  (PRN):  ALBUTerol    90 MICROgram(s) HFA Inhaler 1 Puff(s) Inhalation every 6 hours PRN Shortness of Breath and/or Wheezing  magnesium hydroxide Suspension 30 milliLiter(s) Oral daily PRN Constipation  methocarbamol 750 milliGRAM(s) Oral every 8 hours PRN spasm  ondansetron Injectable 4 milliGRAM(s) IV Push every 6 hours PRN Nausea and/or Vomiting  oxyCODONE    IR 10 milliGRAM(s) Oral every 3 hours PRN Moderate Pain (4 - 6)  oxyCODONE    IR 15 milliGRAM(s) Oral every 3 hours PRN Severe Pain (7 - 10)    Physical exam: The right knee dressing is clean, dry and intact. The calf is supple nontender. Passive range of motion is acceptable to due postoperative pain. Sensation to light touch is grossly intact distally. Motor function distally is 5/5. Extensor hallucis longus and flexor hallucis longus are intact. No foot drop. 2+ dorsalis pedis pulse. Capillary refill is less than 2 seconds. No cyanosis.    Primary Orthopedic Assessment:  • s/p RIGHT total knee replacement    Secondary  Medical Assessment(s):   • COPD, DVT/ PE     Plan:   • DVT prophylaxis as prescribed, including use of compression devices and ankle pumps  • Continue physical therapy  • Weightbearing as tolerated of the right lower extremity with assistance of a walker  • Incentive spirometry encouraged  • Pain control as clinically indicated  • Discharge planning – anticipated discharge is Home today when cleared by Medicine and PT   
Ortho Post Op Check    Name: AMBER WILD    MR #: [id number]    Procedure: right total knee arthroplasty   Surgeon: Nett    Pt comfortable without complaints, pain controlled  Denies CP, SOB, N/V, numbness/tingling     General Exam:  Vital Signs Last 24 Hrs  T(C): 36.8 (01-15-21 @ 19:51), Max: 36.8 (01-15-21 @ 19:51)  T(F): 98.3 (01-15-21 @ 19:51), Max: 98.3 (01-15-21 @ 19:51)  HR: 83 (01-15-21 @ 19:51) (83 - 83)  BP: 148/89 (01-15-21 @ 19:51) (148/89 - 148/89)  BP(mean): --  RR: 18 (01-15-21 @ 19:51) (18 - 18)  SpO2: 93% (01-15-21 @ 19:51) (93% - 93%)    General: Pt Alert and oriented, NAD, controlled pain.  Dressings C/D/I. No bleeding.  Pulses: 2+ dorsalis pedis pulse. Cap refill < 2 sec.  Sensation: Grossly intact to light touch without deficit.  Motor: + EHL/FHL/TA/GS    Post-op X-Ray:    < from: Xray Knee 1 or 2 Views, Right (01.15.21 @ 10:45) >     EXAM:  KNEE-RIGHT                          PROCEDURE DATE:  01/15/2021        INTERPRETATION:  Right knee. Postoperative AP and lateral views show a knee replacement with good alignment. No bone destruction or fracture.    IMPRESSION: Right kneereplacement.      TAY MELVIN MD; Attending Radiologist  This document has been electronically signed. Jose 15 2021  3:40PM    < end of copied text >      A/P: 46yFemale POD#0 s/p right TKA  - Stable  - Pain Control  - DVT ppx: eliquis  - Post op abx: ancef, keflex  - Weight bearing status: wbat
AMBER WILD    [id number]    46y      Female    CC: rt knee pain s/p Rt TKR POD#1  Doing well, pain is well controlled , ambulated with PT,tolerating po, urinating without any issues. denies any light headedness/dizziness on ambulation      INTERVAL HPI/OVERNIGHT EVENTS: no acute events     REVIEW OF SYSTEMS:    CONSTITUTIONAL: No fever  RESPIRATORY: No cough, wheezing,  No shortness of breath  CARDIOVASCULAR: No chest pain, palpitations  GASTROINTESTINAL: No abdominal or epigastric pain. No nausea, vomiting      Vital Signs Last 24 Hrs  T(C): 36.7 (16 Jan 2021 12:00), Max: 36.8 (15 Jose 2021 19:51)  T(F): 98.1 (16 Jan 2021 12:00), Max: 98.3 (15 Jose 2021 19:51)  HR: 79 (16 Jan 2021 12:00) (71 - 100)  BP: 139/79 (16 Jan 2021 12:00) (136/88 - 177/89)  BP(mean): --  RR: 16 (16 Jan 2021 12:00) (16 - 19)  SpO2: 95% (16 Jan 2021 12:00) (93% - 96%)    PHYSICAL EXAM:    GENERAL: NAD, well-groomed  HEENT: PERRL, +EOMI  NECK: soft, Supple  CHEST/LUNG: Clear to percussion bilaterally; No wheezing  HEART: S1S2+, Regular rate and rhythm; No murmurs  ABDOMEN: Soft, Nontender, Nondistended; Bowel sounds present  EXTREMITIES: No clubbing, cyanosis, or edema  SKIN: No rashes or lesions  NEURO: AAOX3      01-15 @ 07:01 - 01-16 @ 07:00  --------------------------------------------------------  IN: 0 mL / OUT: 1850 mL / NET: -1850 mL    01-16 @ 07:01 - 01-16 @ 15:20  --------------------------------------------------------  IN: 1080 mL / OUT: 950 mL / NET: 130 mL        LABS:                        11.4   13.85 )-----------( 262      ( 16 Jan 2021 05:58 )             35.2     01-16    136  |  101  |  11.0  ----------------------------<  143<H>  4.4   |  24.0  |  0.62    Ca    9.0      16 Jan 2021 05:58              MEDICATIONS  (STANDING):  acetaminophen   Tablet .. 975 milliGRAM(s) Oral every 8 hours  amphetamine/dextroamphetamine 20 milliGRAM(s) Oral daily  apixaban 2.5 milliGRAM(s) Oral two times a day  cephalexin 500 milliGRAM(s) Oral four times a day  diazepam    Tablet 10 milliGRAM(s) Oral every 8 hours  gabapentin 300 milliGRAM(s) Oral three times a day  methadone    Tablet 75 milliGRAM(s) Oral daily  pantoprazole    Tablet 40 milliGRAM(s) Oral before breakfast  senna 2 Tablet(s) Oral at bedtime  sodium chloride 0.9%. 500 milliLiter(s) (500 mL/Hr) IV Continuous <Continuous>  sodium chloride 0.9%. 1000 milliLiter(s) (100 mL/Hr) IV Continuous <Continuous>  tiotropium 18 MICROgram(s) Capsule 1 Capsule(s) Inhalation daily    MEDICATIONS  (PRN):  ALBUTerol    90 MICROgram(s) HFA Inhaler 1 Puff(s) Inhalation every 6 hours PRN Shortness of Breath and/or Wheezing  magnesium hydroxide Suspension 30 milliLiter(s) Oral daily PRN Constipation  methocarbamol 750 milliGRAM(s) Oral every 8 hours PRN spasm  ondansetron Injectable 4 milliGRAM(s) IV Push every 6 hours PRN Nausea and/or Vomiting  oxyCODONE    IR 10 milliGRAM(s) Oral every 3 hours PRN Moderate Pain (4 - 6)  oxyCODONE    IR 15 milliGRAM(s) Oral every 3 hours PRN Severe Pain (7 - 10)      RADIOLOGY & ADDITIONAL TESTS:

## 2021-01-16 NOTE — DISCHARGE NOTE PROVIDER - NSDCMRMEDTOKEN_GEN_ALL_CORE_FT
Adderall 20 mg oral tablet:   Eliquis 5 mg oral tablet: 1 tab(s) orally 2 times a day  methadone: 75 milligram(s) orally once a day  Valium 10 mg oral tablet: 1 tab(s) orally 3 times a day, As Needed   Adderall 20 mg oral tablet:   apixaban 2.5 mg oral tablet: 1 tab(s) orally 2 times a day for 5 days, then resume home dose  cefadroxil 500 mg oral capsule: 1 cap(s) orally every 12 hours for 7 days  methadone: 75 milligram(s) orally once a day  oxyCODONE 5 mg oral tablet: 1-2 tab(s) orally every 4 to 6 hours, As Needed for pain MDD:10  Senna S 50 mg-8.6 mg oral tablet: 2 tab(s) orally once a day (at bedtime) for constipation  Valium 10 mg oral tablet: 1 tab(s) orally 3 times a day, As Needed

## 2021-01-16 NOTE — DISCHARGE NOTE NURSING/CASE MANAGEMENT/SOCIAL WORK - NSDCPNINST_GEN_ALL_CORE
Patient has been on methadone (75mg dose) and oxycodone for pain during the course of her hospital stay 1/15 through 1/16/21.

## 2021-01-16 NOTE — DISCHARGE NOTE PROVIDER - NSDCFUADDINST_GEN_ALL_CORE_FT
The patient will be seen in the office between 2-3 weeks for wound check.   **Your first post-operative visit has been scheduled prior to your admission. PLEASE CONTACT OFFICE TO CONFIRM THE APPOINTMENT DATE. Sutures/Staples/Tape will be removed at that time.  **  The silver based dressing is to be removed 7 days from the date of surgery.   ** CONTACT THE OFFICE IF THE FOLLOWING DEVELOP:  - the dressing becomes soiled or saturated  - you develop a fever greater that 101F  - the wound becomes red or you develop blistering around the wound  * Patient may shower after post-op day #3.   * The patient will continue home PT consistent with  total knee replacement protocol. Transition to outpatient PT will occur at the time of the first office visit.   * The patient will practice knee extension exercises regularly to minimize hamstring contraction.   * The patient is FULL weight bearing.  * The patient will take Eliquis 2.5mg bid for 5 days, then resume home dose of 5 mg bid.  * The patient will take OXYCODONE AND TYLENOL for pain control and adjust according to prescription and patient needs. Contact the office if pain increases while taking prescribed pain medications or related concerns develop.  * Celebrex will be taken twice daily for 3 weeks for pain control and prevention of excessive bone growth. Additional prescription may be requested at your office follow-up visit.   * The patient will take Senna S while taking oxycodone to prevent narcotic associated constipation.  Additionally, increase water intake (drink at least 8 glasses of water daily) and try adding fiber to the diet by eating fruits, vegetables and foods that are rich in grains. If constipation is experienced, contact the medical/primary care provider to discuss further treatment options.  * To avoid injury at home:  - continue use of rolling walker until cleared by physical therapist  - have family or friend remove all throw rug or objects in hallways that may present a trip hazard.  - if you experience any dizziness or medical concerns, call your medical doctor or  911.  * The implant may activate metal detection devices.   * The patient will take Duricef two times per day for 7 days for infection prevention The patient will be seen in the office between 2-3 weeks for wound check.   **Your first post-operative visit has been scheduled prior to your admission. PLEASE CONTACT OFFICE TO CONFIRM THE APPOINTMENT DATE. Sutures/Staples/Tape will be removed at that time.  **  The silver based dressing is to be removed 7 days from the date of surgery.   ** CONTACT THE OFFICE IF THE FOLLOWING DEVELOP:  - the dressing becomes soiled or saturated  - you develop a fever greater that 101F  - the wound becomes red or you develop blistering around the wound  * Patient may shower after post-op day #3.   * The patient will continue home PT consistent with  total knee replacement protocol. Transition to outpatient PT will occur at the time of the first office visit.   * The patient will practice knee extension exercises regularly to minimize hamstring contraction.   * The patient is FULL weight bearing.  * The patient will take Eliquis 2.5mg bid for 5 days, then resume home dose of 5 mg bid.  * The patient will take OXYCODONE AND TYLENOL for pain control and adjust according to prescription and patient needs. Contact the office if pain increases while taking prescribed pain medications or related concerns develop.  * The patient will take Senna S while taking oxycodone to prevent narcotic associated constipation.  Additionally, increase water intake (drink at least 8 glasses of water daily) and try adding fiber to the diet by eating fruits, vegetables and foods that are rich in grains. If constipation is experienced, contact the medical/primary care provider to discuss further treatment options.  * To avoid injury at home:  - continue use of rolling walker until cleared by physical therapist  - have family or friend remove all throw rug or objects in hallways that may present a trip hazard.  - if you experience any dizziness or medical concerns, call your medical doctor or  911.  * The implant may activate metal detection devices.   * The patient will take Duricef two times per day for 7 days for infection prevention

## 2021-01-16 NOTE — DISCHARGE NOTE PROVIDER - HOSPITAL COURSE
The patient underwent a RIGHT TOTAL KNEE REPLACEMENT on 1/15/21. The patient received antibiotics consistent with SCIP guidelines. The patient underwent the procedure and had no intra-operative complications. Post-operatively, the patient was seen by medicine and PT. The patient received ELIQUIS for DVTP. The patient received pain medications per orthopedic pain managment pathway and the pain was appropriately controlled. The patient did not have any post-operative medical complications. The patient was discharged in stable condition.

## 2021-01-16 NOTE — PROGRESS NOTE ADULT - ASSESSMENT
44 yo F PMH of COPD, DVT/PE (on eliquis), current smoker, depression and anxiety, former IVDA, known end stage OA of right knee, c/o right knee pain. Pt had right knee arthroscopy in 2003 after ACL injury to the right knee during her teens She reports medical history of endocarditis. Now S/P right TKR w/Dr Sauer      Problem/Recommendation - 1:  Problem: Osteoarthritis of right knee. Recommendation: S/P right TKR  Post op ABX as per SCIP  Pain management consult for pain control  DVT ppx/PT as per Ortho  IS.     Problem/Recommendation - 2:  ·  Problem: High blood pressure.  Recommendation: As per patient BP has been borderline, not currently on medications. Pain contributing to elevated readings.   needs close f/u with PMD     Problem/Recommendation - 3:  ·  Problem: COPD without exacerbation.  Recommendation: Continue inhalers (patient is on albuterol and Spiriva)  Patient follows with Pulmonologist.      Problem/Recommendation - 4:  ·  Problem: Personal history of DVT (deep vein thrombosis).  Recommendation: on Eliquis 5 mg BID, resume when ok with ortho.      Problem/Recommendation - 5:  ·  Problem: Long-term current use of methadone for opiate dependence.  Recommendation: Seen by Pain management, ct home dose methadone       Problem/Recommendation - 6:  Problem: Current smoker. Recommendation: Smoking cessation advised. encouraged to use gum as needed to help.      Problem/Recommendation - 7:  Problem: H/O endocarditis. Recommendation: No surgical intervention  Follows with outpatient Cardiologist.     Problem/Recommendation - 8:  Problem: Need for prophylactic measure. Recommendation: DVT ppx: Eliquis 2.5 mg BID  Bowel RX for opioid induced constipation ppx.     Problem/Recommendation - 9:  Problem: Urinary retention. Recommendation:resolved     Medically stable for discharge.

## 2021-01-16 NOTE — DISCHARGE NOTE NURSING/CASE MANAGEMENT/SOCIAL WORK - PATIENT PORTAL LINK FT
You can access the FollowMyHealth Patient Portal offered by [facility] by registering at the following website: [email or web address]. By joining [email or web address] FollowMyHealth portal, you will also be able to view your health information using other applications (apps) compatible with our system.

## 2021-01-16 NOTE — DISCHARGE NOTE PROVIDER - NSDCFUSCHEDAPPT_GEN_ALL_CORE_FT
AMBER WILD ; 02/05/2021 ; [facility] [address] ; 03/09/2021 ; [facility] [address] ; 04/07/2021 ; [facility] [address]

## 2021-01-22 RX ORDER — OXYCODONE 5 MG/1
5 TABLET ORAL
Qty: 40 | Refills: 0 | Status: ACTIVE | COMMUNITY
Start: 2021-01-22 | End: 1900-01-01

## 2021-02-05 ENCOUNTER — APPOINTMENT (OUTPATIENT)
Dept: ORTHOPEDIC SURGERY | Facility: CLINIC | Age: 46
End: 2021-02-05
Payer: MEDICAID

## 2021-02-05 VITALS
HEART RATE: 94 BPM | BODY MASS INDEX: 30.31 KG/M2 | HEIGHT: 68 IN | DIASTOLIC BLOOD PRESSURE: 106 MMHG | SYSTOLIC BLOOD PRESSURE: 168 MMHG | WEIGHT: 200 LBS

## 2021-02-05 PROCEDURE — 99024 POSTOP FOLLOW-UP VISIT: CPT

## 2021-02-05 PROCEDURE — 73562 X-RAY EXAM OF KNEE 3: CPT | Mod: RT

## 2021-02-05 NOTE — HISTORY OF PRESENT ILLNESS
[Healed] : healed [Neuro Intact] : an unremarkable neurological exam [Vascular Intact] : ~T peripheral vascular exam normal [Negative Sotero's] : maneuvers demonstrated a negative Sotero's sign [Xray (Date:___)] : [unfilled] Xray -  [Doing Well] : is doing well [No Sign of Infection] : is showing no signs of infection [Adequate Pain Control] : has adequate pain control [Procedure: ___] : status post [unfilled] [2] : the patient reports pain that is 2/10 in severity [Swelling] : swollen [Chills] : no chills [Constipation] : no constipation [Diarrhea] : no diarrhea [Dysuria] : no dysuria [Fever] : no fever [Nausea] : no nausea [Vomiting] : no vomiting [Erythema] : not erythematous [Discharge] : absent of discharge [Dehiscence] : not dehisced [de-identified] : s/p right knee complex primary Persona revision CCK TKA with computer assisted tibial resection, OrthAlign procedure. DOS: 1/15/21. [de-identified] : Pt is 3 weeks post-op. She has finished pain medication and is back to methadone. She is taking Tylenol. Pt notes that she has pain when she stands for long periods of time. She completed home PT. She ambulates with a cane. Pt is no longer smoking.  [de-identified] : right knee exam shows ROM of 5-100 degree\par incision is healed [de-identified] : 3V xray of the right knee done in the office today and reviewed by Dr. Yeison Sauer demonstrates s/p implants in good positioning with no evidence of wear, loosening, or subsidence.  [de-identified] : Pt is no longer smoking. She should continue to do low impact exercises. We prescribed outpatient PT. Pt understands the importance of prophylaxis for invasive dental procedures. F/u with us in 3 weeks.

## 2021-02-05 NOTE — END OF VISIT
[FreeTextEntry3] : I, Joshua Stephens, acted solely as a scribe for Dr. Yeison Sauer on this date 02/05/2021.

## 2021-02-16 ENCOUNTER — NON-APPOINTMENT (OUTPATIENT)
Age: 46
End: 2021-02-16

## 2021-03-09 ENCOUNTER — APPOINTMENT (OUTPATIENT)
Dept: ORTHOPEDIC SURGERY | Facility: CLINIC | Age: 46
End: 2021-03-09

## 2021-04-07 ENCOUNTER — APPOINTMENT (OUTPATIENT)
Dept: ORTHOPEDIC SURGERY | Facility: CLINIC | Age: 46
End: 2021-04-07
Payer: MEDICAID

## 2021-04-07 VITALS
HEIGHT: 68 IN | DIASTOLIC BLOOD PRESSURE: 90 MMHG | BODY MASS INDEX: 30.31 KG/M2 | WEIGHT: 200 LBS | TEMPERATURE: 98.2 F | HEART RATE: 88 BPM | SYSTOLIC BLOOD PRESSURE: 132 MMHG

## 2021-04-07 DIAGNOSIS — Z96.651 PRESENCE OF RIGHT ARTIFICIAL KNEE JOINT: ICD-10-CM

## 2021-04-07 DIAGNOSIS — Z47.1 AFTERCARE FOLLOWING JOINT REPLACEMENT SURGERY: ICD-10-CM

## 2021-04-07 DIAGNOSIS — Z96.651 AFTERCARE FOLLOWING JOINT REPLACEMENT SURGERY: ICD-10-CM

## 2021-04-07 PROCEDURE — 99024 POSTOP FOLLOW-UP VISIT: CPT

## 2021-04-07 PROCEDURE — 73562 X-RAY EXAM OF KNEE 3: CPT | Mod: RT

## 2021-04-07 NOTE — END OF VISIT
[FreeTextEntry3] : I, Joshua Stephens, acted solely as a scribe for Dr. Yeison Sauer on this date 04/07/2021.

## 2021-04-07 NOTE — HISTORY OF PRESENT ILLNESS
[Procedure: ___] : status post [unfilled] [Clean/Dry/Intact] : clean, dry and intact [Healed] : healed [Swelling] : swollen [Neuro Intact] : an unremarkable neurological exam [Vascular Intact] : ~T peripheral vascular exam normal [Negative Sotero's] : maneuvers demonstrated a negative Sotero's sign [Xray (Date:___)] : [unfilled] Xray -  [Doing Well] : is doing well [No Sign of Infection] : is showing no signs of infection [1] : the patient reports pain that is 1/10 in severity [Excellent Pain Control] : has excellent pain control [Chills] : no chills [Constipation] : no constipation [Diarrhea] : no diarrhea [Dysuria] : no dysuria [Fever] : no fever [Nausea] : no nausea [Vomiting] : no vomiting [Erythema] : not erythematous [Discharge] : absent of discharge [Dehiscence] : not dehisced [de-identified] : S/P Right knee total knee replacement. DOS: 1/15/2021 \par  [de-identified] : Pt is 12 weeks post-op. The pt has very minimal pain on the medial and lateral aspect of the knee. She is able to go up and down stairs without difficulty. She has been using a stationary bike. She is in outpatient PT. Pt ambulates without any assistive devices.  [de-identified] : Right knee exam shows healed incision with no sign of infection. Range of motion 0-120 degrees.  [de-identified] : 3V xray of the right knee done in the office today and reviewed by Dr. Yeison Sauer demonstrates s/p implants in good positioning with no evidence of wear, loosening, or subsidence.  [de-identified] : She should continue to do low impact exercises. Pt understands the importance of prophylaxis for invasive dental procedures. F/u with us a year from surgery.

## 2021-04-15 ENCOUNTER — FORM ENCOUNTER (OUTPATIENT)
Age: 46
End: 2021-04-15

## 2021-12-01 PROCEDURE — G9005: CPT

## 2022-05-31 ENCOUNTER — APPOINTMENT (OUTPATIENT)
Dept: ORTHOPEDIC SURGERY | Facility: CLINIC | Age: 47
End: 2022-05-31
Payer: MEDICAID

## 2022-05-31 VITALS
HEIGHT: 68 IN | BODY MASS INDEX: 30.31 KG/M2 | HEART RATE: 74 BPM | SYSTOLIC BLOOD PRESSURE: 129 MMHG | WEIGHT: 200 LBS | DIASTOLIC BLOOD PRESSURE: 80 MMHG

## 2022-05-31 DIAGNOSIS — M25.569 PAIN IN UNSPECIFIED KNEE: ICD-10-CM

## 2022-05-31 PROCEDURE — 99213 OFFICE O/P EST LOW 20 MIN: CPT

## 2022-05-31 PROCEDURE — 73564 X-RAY EXAM KNEE 4 OR MORE: CPT | Mod: LT

## 2022-05-31 RX ORDER — DICLOFENAC SODIUM 1% 10 MG/G
1 GEL TOPICAL DAILY
Qty: 1 | Refills: 0 | Status: ACTIVE | COMMUNITY
Start: 2022-05-31 | End: 1900-01-01

## 2022-05-31 NOTE — PHYSICAL EXAM
[de-identified] : GENERAL APPEARANCE: Well nourished and hydrated, pleasant, alert, and oriented x 3. Appears their stated age. \par HEENT: Normocephalic, extraocular eye motion intact. Nasal septum midline. Oral cavity clear. External auditory canal clear. \par RESPIRATORY: Breath sounds clear and audible in all lobes. No wheezing, No accessory muscle use.\par CARDIOVASCULAR: No apparent abnormalities.  There is bilateral lower extremity edema with ulcers present on the right lower extremity pedal pulses are palpable.\par NEUROLOGIC: Sensation is normal, no muscle weakness in the upper or lower extremities.\par DERMATOLOGIC: No apparent skin lesions, moist, warm, no rash.\par SPINE: Cervical spine appears normal and moves freely; thoracic spine appears normal and moves freely; lumbosacral spine appears normal and moves freely, normal, nontender.\par MUSCULOSKELETAL: Hands, wrists, and elbows are normal and move freely, shoulders are normal and move freely. \par Psychiatric: Oriented to person, place, and time, insight and judgement were intact and the affect was normal. \par Musculoskeletal:. Left knee exam shows no effusion, ROM is 0-1 30 degrees, no instability, no pain with Gosia, medial joint line tenderness. \par 5/5 motor strength in bilateral lower extremities. Sensory: Intact in bilateral lower extremities. DTRs: Biceps, brachioradialis, triceps, patellar, ankle and plantar 2+ and symmetric bilaterally. Pulses: dorsalis pedis, posterior tibial, femoral, popliteal, and radial 2+ and symmetric bilaterally. \par Constitutional: Alert and in no acute distress, but well-appearing.  [de-identified] : 4 views left knee obtained the office today show no acute fracture or dislocation.  There is medial joint space narrowing bone-on-bone osteoarthritis most pronounced on the Rosario view consistent with Kellgren-Juwan grade 3 changes.

## 2022-05-31 NOTE — HISTORY OF PRESENT ILLNESS
[de-identified] : Patient is a 47-year-old female here today for evaluation of left knee pain has been going on for the past 1 week.  Patient states that she had no injury however she started to experience pain in the knee.  Has been using compression as well as IcyHot and states that helped the pain in the knee.  Was mainly over the medial aspect of the knee.  Did have some minor swelling.  Is on methadone for history of opioid abuse.  Denies any fevers chills or constitutional symptoms.  Does have ulcers on her right foot and ankle.  States that she wraps her left leg to prevent ulcers.  Denies any current ulcers on the left leg.

## 2023-02-14 NOTE — PATIENT PROFILE ADULT - BILL PAYMENT
Daily Note     Today's date: 2023  Patient name: Britney Ledesma  : 1967  MRN: [id number]  Referring provider: Santo Booth MD  Dx:   Encounter Diagnosis     ICD-10-CM    1  Acute right-sided low back pain with right-sided sciatica  M54 39                      Subjective:  Pt reports feeling better  Only having trouble with sitting  Objective:  See treatment diary below      Assessment:  Pt presented to outpatient physical therapy at [place] [address] with complaints of R LBP with posterior R LE pain and R groin pain   She is having the most difficulty when sitting > walking due to pain   Patient was referred to PT through [address] will continue to benefit from skilled PT services with focus on manual treatment to address her hypomobility issues, stretching and core/postural strengthening in order to return to all normal activities and reach maximum level of function  Much less pain today vs last week at   Mod less pain with long leg traction on R           Plan:   PT prn  Add more core strengthening         POC EXPIRES On:  3/11/23  PRECAUTIONS:  None  CO-MORBIDITES:  L cervical radic  PERSONAL FACTORS:  Nurse      Manuals HEP           STM R upper glut in L S/L   5' 6'          Lower lumbar rotational mobs in L S/L   2' 2'          L S/L lower lumbar gapping  3' 3'          R long leg traction   3'          Neuro Re-Ed     Seated R LE nerve sliders  10 10          Supine PPT  5" 10 5" 15                                                                           Ther Ex    Supine strap R SKTC  10" 5 10" 5          Supine R strap piriformis stretch  10" 5 10" 5           R hip drop off 8" step   15" 3                                                                           Ther Activity                              Gait Training                              Modalities no

## 2023-03-06 ENCOUNTER — APPOINTMENT (OUTPATIENT)
Dept: ORTHOPEDIC SURGERY | Facility: CLINIC | Age: 48
End: 2023-03-06
Payer: MEDICAID

## 2023-03-06 VITALS
WEIGHT: 200 LBS | DIASTOLIC BLOOD PRESSURE: 101 MMHG | BODY MASS INDEX: 30.31 KG/M2 | HEIGHT: 68 IN | SYSTOLIC BLOOD PRESSURE: 165 MMHG | HEART RATE: 90 BPM

## 2023-03-06 DIAGNOSIS — M25.552 PAIN IN RIGHT HIP: ICD-10-CM

## 2023-03-06 DIAGNOSIS — M25.551 PAIN IN RIGHT HIP: ICD-10-CM

## 2023-03-06 PROCEDURE — 99213 OFFICE O/P EST LOW 20 MIN: CPT

## 2023-03-06 PROCEDURE — 73523 X-RAY EXAM HIPS BI 5/> VIEWS: CPT

## 2023-03-06 NOTE — PHYSICAL EXAM
[de-identified] : Musculoskeletal: ambulates normally. Right hip exam showed no groin pain with SLR, ROM is full flexion with 60 degrees of external rotation and 30 degrees of internal rotation without pain, SUSANA negative, FADIR of negative\par Left hip exam showed no groin pain with SLR, ROM is full flexion with 60 degrees of external rotation and 30 degrees of internal rotation without pain, SUSANA and negative, FADIR negative\par 5/5 motor strength in bilateral lower extremities. Sensory: Intact in bilateral lower extremities. DTRs: Biceps, brachioradialis, triceps, patellar, ankle and plantar 2+ and symmetric bilaterally. Pulses: dorsalis pedis, posterior tibial, femoral, popliteal, and radial 2+ and symmetric bilaterally.  [de-identified] : AP pelvis and 2 views of bilateral hips obtained the office today show no acute fracture or dislocation.  For the left hip there is possible mild joint space narrowing small cyst formation consistent with mild left hip osteoarthritis

## 2023-03-06 NOTE — DISCUSSION/SUMMARY
[Medication Risks Reviewed] : Medication risks reviewed [de-identified] : Patient is a 48-year-old female presenting today for evaluation of bilateral hip pain has been going for the past month.  Her pain has now resolved and she is ambulating without issue.  She does have a history of polyarthralgias as well as issues with her hands elbows knees and feet.  She is under the care of a rheumatologist.  I recommended she follow-up with her rheumatologist for this.  I recommend she continue with low impact activity and exercises.  She is to take Tylenol and NSAIDs as needed for the pain.  I will see her back on an as-needed basis for her bilateral hips.  All questions were asked and answered.

## 2023-03-06 NOTE — HISTORY OF PRESENT ILLNESS
[de-identified] : Patient is a 48-year-old female here today for evaluation of left worse than right hip and groin pain is gone for the past month.  Patient states that she had a severe stabbing-like sensation in her left groin.  States that the right groin would also hurts sometimes with going up and down the stairs.  States that the pain is now resolved.  Has polyarthralgias and has pain in her shoulders elbows hands knees and feet.  States that she is under the care of a rheumatologist for this.  Denies any trauma.  Denies any numbness or tingling in the leg.  Is not currently taking any pain medications as she states the pain is now resolved

## 2023-03-24 ENCOUNTER — APPOINTMENT (OUTPATIENT)
Dept: ORTHOPEDIC SURGERY | Facility: CLINIC | Age: 48
End: 2023-03-24
Payer: MEDICAID

## 2023-03-24 VITALS
HEART RATE: 80 BPM | BODY MASS INDEX: 30.31 KG/M2 | SYSTOLIC BLOOD PRESSURE: 157 MMHG | HEIGHT: 68 IN | WEIGHT: 200 LBS | DIASTOLIC BLOOD PRESSURE: 86 MMHG

## 2023-03-24 PROCEDURE — 20526 THER INJECTION CARP TUNNEL: CPT | Mod: 50,59

## 2023-03-24 PROCEDURE — 20550 NJX 1 TENDON SHEATH/LIGAMENT: CPT | Mod: F8

## 2023-03-24 PROCEDURE — 99214 OFFICE O/P EST MOD 30 MIN: CPT | Mod: 25

## 2023-03-24 PROCEDURE — 73110 X-RAY EXAM OF WRIST: CPT | Mod: 50

## 2023-03-24 NOTE — HISTORY OF PRESENT ILLNESS
[FreeTextEntry1] : Liana is a pleasant 48-year-old female who comes in today with multiple complaints.  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  She complains of bilateral wrist and hand discomfort with numbness and tingling in the fingertips.  She also complains of worsening right ring finger pain swelling triggering and stiffness.

## 2023-03-24 NOTE — PHYSICAL EXAM
[de-identified] : She is well-appearing on examination\par Examination of the right and left wrist reveals discomfort with compression at the level of the volar carpal tunnel eliciting numbness/tingling throughout the fingertips\par Examination of the [right] hand particularly at the A1 of the ring reveals tenderness with a palpable click. \par [There is associated PIP joint stiffness as well]\par \par  [de-identified] : [4] views of [bilateral hands and wrists] were obtained today in my office and were seen by me and discussed with the patient. \par These [show findings consistent with bilateral basal joint OA and findings of IP joint OA]\par

## 2023-03-24 NOTE — ASSESSMENT
[FreeTextEntry1] : ASSESSMENT:\par \par The patient comes in today with multiple exacerbated conditions including bilateral carpal tunnel disease as well as tendinopathy of the right ring finger.  The symptoms appear to be somewhat acute on chronic on examination.  She seems somewhat emotional.  At this point she would definitely benefit from immediate release in the form of injection therapy.\par [I have diagnosed the patient today with a new diagnosis - This may diminish bodily function for the extremity.] \par \par [For this I was able to review other physician’s note(s) including reviewing other tests, imaging results as well as personally view these results for my own interpretation.]\par \par Injection:\par \par The risks and benefits of a steroid injection were discussed in detail. The risks include but are not limited to: pain, infection, swelling, flare response, bleeding, subcutaneous fat atrophy, skin depigmentation and/or elevation of blood sugar. The risk of incomplete resolution of symptoms, recurrence and additional intervention was reviewed and considered by the patient. \par The patient agreed to proceed and under a sterile prep, I injected 1 unit into 2 cc of a combination of Celestone and Lidocaine into the right carpal tunnel, left carpal tunnel, right ring A1. The patient tolerated the injection well.\par \par The patient was adequately and thoroughly informed of my assessment of their current condition(s). \par \par DISCUSSION:\par 1.  I am hopeful that the injection to bilateral carpal tunnel as well as the right ring will help relieve her symptoms.  Upon injection she did express significant relief and improvement of symptoms.\par 2.  I would like to see her back in 4 months time as needed\par \par

## 2023-03-24 NOTE — PHYSICAL EXAM
[de-identified] : She is well-appearing on examination\par Examination of the right and left wrist reveals discomfort with compression at the level of the volar carpal tunnel eliciting numbness/tingling throughout the fingertips\par Examination of the [right] hand particularly at the A1 of the ring reveals tenderness with a palpable click. \par [There is associated PIP joint stiffness as well]\par \par  [de-identified] : [4] views of [bilateral hands and wrists] were obtained today in my office and were seen by me and discussed with the patient. \par These [show findings consistent with bilateral basal joint OA and findings of IP joint OA]\par

## 2023-03-28 ENCOUNTER — APPOINTMENT (OUTPATIENT)
Dept: ORTHOPEDIC SURGERY | Facility: CLINIC | Age: 48
End: 2023-03-28

## 2023-07-10 ENCOUNTER — APPOINTMENT (OUTPATIENT)
Dept: ORTHOPEDIC SURGERY | Facility: CLINIC | Age: 48
End: 2023-07-10

## 2023-07-27 ENCOUNTER — APPOINTMENT (OUTPATIENT)
Dept: ORTHOPEDIC SURGERY | Facility: CLINIC | Age: 48
End: 2023-07-27

## 2023-10-19 ENCOUNTER — APPOINTMENT (OUTPATIENT)
Dept: ORTHOPEDIC SURGERY | Facility: CLINIC | Age: 48
End: 2023-10-19
Payer: MEDICAID

## 2023-10-19 VITALS
WEIGHT: 200 LBS | HEIGHT: 68 IN | BODY MASS INDEX: 30.31 KG/M2 | DIASTOLIC BLOOD PRESSURE: 84 MMHG | SYSTOLIC BLOOD PRESSURE: 130 MMHG

## 2023-10-19 PROCEDURE — 99214 OFFICE O/P EST MOD 30 MIN: CPT | Mod: 25

## 2023-10-19 PROCEDURE — 20526 THER INJECTION CARP TUNNEL: CPT | Mod: LT

## 2023-10-19 PROCEDURE — 99213 OFFICE O/P EST LOW 20 MIN: CPT | Mod: 25

## 2023-10-23 ENCOUNTER — APPOINTMENT (OUTPATIENT)
Dept: ORTHOPEDIC SURGERY | Facility: CLINIC | Age: 48
End: 2023-10-23
Payer: MEDICAID

## 2023-10-23 VITALS
BODY MASS INDEX: 30.31 KG/M2 | HEART RATE: 80 BPM | WEIGHT: 200 LBS | HEIGHT: 68 IN | SYSTOLIC BLOOD PRESSURE: 157 MMHG | DIASTOLIC BLOOD PRESSURE: 97 MMHG

## 2023-10-23 DIAGNOSIS — M47.816 SPONDYLOSIS W/OUT MYELOPATHY OR RADICULOPATHY, LUMBAR REGION: ICD-10-CM

## 2023-10-23 PROCEDURE — 72110 X-RAY EXAM L-2 SPINE 4/>VWS: CPT

## 2023-10-23 PROCEDURE — 99214 OFFICE O/P EST MOD 30 MIN: CPT

## 2023-10-23 RX ORDER — GABAPENTIN 300 MG/1
300 CAPSULE ORAL TWICE DAILY
Qty: 60 | Refills: 1 | Status: ACTIVE | COMMUNITY
Start: 2023-10-23 | End: 1900-01-01

## 2023-12-04 ENCOUNTER — APPOINTMENT (OUTPATIENT)
Dept: ORTHOPEDIC SURGERY | Facility: CLINIC | Age: 48
End: 2023-12-04

## 2024-01-26 ENCOUNTER — APPOINTMENT (OUTPATIENT)
Dept: ORTHOPEDIC SURGERY | Facility: CLINIC | Age: 49
End: 2024-01-26
Payer: MEDICAID

## 2024-01-26 DIAGNOSIS — M79.646 PAIN IN UNSPECIFIED HAND: ICD-10-CM

## 2024-01-26 DIAGNOSIS — M25.532 PAIN IN LEFT WRIST: ICD-10-CM

## 2024-01-26 DIAGNOSIS — M25.531 PAIN IN RIGHT WRIST: ICD-10-CM

## 2024-01-26 DIAGNOSIS — M79.643 PAIN IN UNSPECIFIED HAND: ICD-10-CM

## 2024-01-26 PROCEDURE — 20526 THER INJECTION CARP TUNNEL: CPT | Mod: 50

## 2024-01-26 PROCEDURE — 99214 OFFICE O/P EST MOD 30 MIN: CPT | Mod: 25

## 2024-01-26 NOTE — ASSESSMENT
[FreeTextEntry1] : ASSESSMENT: The patient comes in today with chronic exacerbated bilateral hand and wrist discomfort.  She describes nighttime symptoms as well as daytime symptoms.  The symptoms are affecting her ADLs.  Her symptoms are consistent with bilateral carpal tunnel syndrome.  Treatment modalities were discussed, the patient elects to proceed with repeat injections today.   The patient was adequately and thoroughly informed of my assessment of their current condition(s).  - This may diminish bodily function for the extremity.  We discussed prognosis, treatment modalities including operative and nonoperative options for the above diagnostic assessment. As always, 2nd opinion is always provided as an option. For this, when accessible, I was able to review other physicians note(s) including reviewing other tests, imaging results as well as personally view these results for my own interpretation.      Injection:    The risks and benefits of a steroid injection were discussed in detail. The risks include but are not limited to: pain, infection, swelling, flare response, bleeding, subcutaneous fat atrophy, skin depigmentation and/or elevation of blood sugar. The risk of incomplete resolution of symptoms, recurrence and additional intervention was reviewed and considered by the patient.  The patient agreed to proceed and under a sterile prep, I injected 1 unit into 1 cc of a combination of Celestone and Lidocaine into the bilateral carpal tunnel. The patient tolerated the injection well.   The patient was adequately and thoroughly informed of my assessment of their current condition(s).    DISCUSSION: 1.  Injections as above.  Activity modifications.  Follow-up as needed. 2. [x] 3. [x]

## 2024-01-26 NOTE — PHYSICAL EXAM
[de-identified] : Examination of the [bilateral] wrist reveals discomfort with compression at the level of the volar carpal tunnel eliciting numbness/tingling throughout the fingertips. [de-identified] : [4] views of [bilateral hands and wrists] were reviewed today in my office and were seen by me and discussed with the patient.  These [show findings consistent with mild bilateral basal joint OA and findings of IP joint OA]

## 2024-01-26 NOTE — HISTORY OF PRESENT ILLNESS
[FreeTextEntry1] : Liana is a 49-year-old female who presents today with chronic exacerbated bilateral hand and wrist discomfort.  She describes nighttime symptoms as well as daytime symptoms.  The symptoms are affecting her ADLs.

## 2024-03-03 DIAGNOSIS — M25.562 PAIN IN LEFT KNEE: ICD-10-CM

## 2024-03-04 ENCOUNTER — APPOINTMENT (OUTPATIENT)
Dept: ORTHOPEDIC SURGERY | Facility: CLINIC | Age: 49
End: 2024-03-04
Payer: MEDICAID

## 2024-03-04 VITALS
HEIGHT: 68 IN | DIASTOLIC BLOOD PRESSURE: 84 MMHG | HEART RATE: 74 BPM | BODY MASS INDEX: 30.31 KG/M2 | WEIGHT: 200 LBS | SYSTOLIC BLOOD PRESSURE: 130 MMHG

## 2024-03-04 DIAGNOSIS — M17.12 UNILATERAL PRIMARY OSTEOARTHRITIS, LEFT KNEE: ICD-10-CM

## 2024-03-04 PROCEDURE — 99214 OFFICE O/P EST MOD 30 MIN: CPT | Mod: 25

## 2024-03-04 PROCEDURE — 20610 DRAIN/INJ JOINT/BURSA W/O US: CPT | Mod: LT

## 2024-03-04 PROCEDURE — 73564 X-RAY EXAM KNEE 4 OR MORE: CPT | Mod: LT

## 2024-03-04 NOTE — PHYSICAL EXAM
[Normal] : Gait: normal [de-identified] : Left lower extremity: No effusion, knee range of motion 0-1 20, no instability varus valgus stress, negative anterior drawer, 5 out of 5 strength for plantarflexion dorsiflexion, sensation intact light touch of the foot, foot well-perfused brisk cap refill, medial joint line tenderness [de-identified] : 4 views of the left knee obtained the office today show no acute fracture or dislocation.  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

## 2024-03-04 NOTE — PROCEDURE
[de-identified] : I injected his left knee. I discussed at length with the patient the planned steroid and lidocaine injection. The risks, benefits, convalescence and alternatives were reviewed. The possible side effects discussed included but were not limited to: pain, swelling, heat, bleeding, and redness. Symptoms are generally mild but if they are extensive then contact the office. Giving pain relievers by mouth such as NSAIDs or Tylenol can generally treat the reactions to steroid and lidocaine. Rare cases of infection have been noted. Rash, hives and itching may occur post injection. If you have muscle pain or cramps, flushing and or swelling of the face, rapid heart beat, nausea, dizziness, fever, chills, headache, difficulty breathing, swelling in the arms or legs, or have a prickly feeling of your skin, contact a health care provider immediately. Following this discussion, the knee was prepped with Alcohol and under sterile condition the 80 mg Depo-Medrol and 6 cc Lidocaine injection was performed with a 20 gauge needle through a superolateral injection site. The needle was introduced into the joint, aspiration was performed to ensure intra-articular placement and the medication was injected. Upon withdrawal of the needle the site was cleaned with alcohol and a band aid applied. The patient tolerated the injection well and there were no adverse effects. Post injection instructions included no strenuous activity for 24 hours, cryotherapy and if there are any adverse effects to contact the office.

## 2024-03-04 NOTE — HISTORY OF PRESENT ILLNESS
[Pain Location] : pain [] : right & left hip [Worsening] : worsening [___ yrs] : [unfilled] year(s) ago [Constant] : ~He/She~ states the symptoms seem to be constant [Standing] : standing [Bending] : worsened by bending [Hip Movement] : worsened by hip movement [Sitting] : worsened by sitting [Running] : worsened by running [Walking] : worsened by walking [de-identified] : Patient is a 49-year-old female here today for follow-up of left knee pain.  She states over the last few weeks she has been having increasing pain in the left knee.  Hurts over the medial aspect.  Hurts to go up and down stairs rising to seated position.  Said good response to cortisone injection in the past.  Denies any falls [de-identified] : putting on socks and shoes, getting in and out of the car

## 2024-03-04 NOTE — ADDENDUM
[FreeTextEntry1] : This note was written by Padmaja Perez, acting as the  for Dr. Cruz. This note accurately reflects the work and decisions made by Dr. Cruz.

## 2024-03-04 NOTE — DISCUSSION/SUMMARY
[Medication Risks Reviewed] : Medication risks reviewed [de-identified] : Patient is a 49-year-old female with severe left knee osteoarthritis presenting today for follow-up.  She has had good response to conservative treatment the past like to continue with that.  I gave her injection of cortisone left knee which she tolerated well.  We discussed low impact activity and exercise.  She will continue take meloxicam 15 mg daily as needed for pain.  I will see her back on an as-needed basis for her left knee possible repeat injection in the future.  All questions were asked and answered.  She was advised may be candidate for total knee arthroplasty in the future

## 2024-03-04 NOTE — REVIEW OF SYSTEMS
[Joint Stiffness] : joint stiffness [Joint Pain] : joint pain [Negative] : Heme/Lymph [FreeTextEntry9] : bilateral hip pain

## 2024-03-08 ENCOUNTER — APPOINTMENT (OUTPATIENT)
Dept: ORTHOPEDIC SURGERY | Facility: CLINIC | Age: 49
End: 2024-03-08
Payer: MEDICAID

## 2024-03-08 DIAGNOSIS — G56.03 CARPAL TUNNEL SYNDROM,BILATERAL UPPER LIMBS: ICD-10-CM

## 2024-03-08 DIAGNOSIS — M65.30 TRIGGER FINGER, UNSPECIFIED FINGER: ICD-10-CM

## 2024-03-08 PROCEDURE — 20526 THER INJECTION CARP TUNNEL: CPT | Mod: 50,59

## 2024-03-08 PROCEDURE — 99214 OFFICE O/P EST MOD 30 MIN: CPT | Mod: 25

## 2024-03-08 PROCEDURE — 20550 NJX 1 TENDON SHEATH/LIGAMENT: CPT | Mod: LT

## 2024-03-08 NOTE — PHYSICAL EXAM
[de-identified] : Examination of the [bilateral] wrist reveals discomfort with compression at the level of the volar carpal tunnel eliciting numbness/tingling throughout the fingertips Examination of the hand(s)  particularly at the A1 of the left ring reveals tenderness with a palpable click. Examination of bilateral hands at the dorsum reveals swelling and healed sites of puncture marks.  No signs of active infection

## 2024-03-08 NOTE — HISTORY OF PRESENT ILLNESS
[FreeTextEntry1] : Liana returns for follow-up with history of chronic recurring bilateral carpal tunnel and tendinitis in the fingers.  She has gotten tremendous improvement with injections and activity modification.

## 2024-03-08 NOTE — ASSESSMENT
[FreeTextEntry1] : ASSESSMENT: The patient comes in today with multiple chronic recurring persistent findings and symptoms of carpal tunnel disease as well as tendinopathy.  At this station the patient would like to proceed with repeat injections. The patient admits to a chronic history of IVDU.  They are not a surgical candidate at this point due to concerns of infection.   The patient was adequately and thoroughly informed of my assessment of their current condition(s).  - This may diminish bodily function for the extremity. We discussed prognosis, tx modalities including operative and nonoperative options for the above diagnostic assessment. As always, 2nd opinion is always provided as an option.  When accessible, I was able to review other physicians note(s) including reviewing other tests, imaging results as well as personally view these results for my own interpretation.   Injection:   The risks and benefits of a steroid injection were discussed in detail. The risks include but are not limited to: pain, infection, swelling, flare response, bleeding, subcutaneous fat atrophy, skin depigmentation and/or elevation of blood sugar. The risk of incomplete resolution of symptoms, recurrence and additional intervention was reviewed and considered by the patient. The patient agreed to proceed and under a sterile prep, I injected 1 unit 6mg into 1 cc of a combination of Celestone and Lidocaine into the bilateral carpal tunnel, left ring A1. The patient tolerated the injection well. The patient was adequately and thoroughly informed of my assessment of their current condition(s).  DISCUSSION: 1.  Injections as above.  Activity modification. 2.  Patient admits to history of IVDU.  Not a surgical candidate due to risk of infection. 3. [x]

## 2024-07-11 ENCOUNTER — APPOINTMENT (OUTPATIENT)
Dept: ORTHOPEDIC SURGERY | Facility: CLINIC | Age: 49
End: 2024-07-11

## 2024-09-13 ENCOUNTER — APPOINTMENT (OUTPATIENT)
Dept: ORTHOPEDIC SURGERY | Facility: CLINIC | Age: 49
End: 2024-09-13

## 2024-09-13 VITALS
SYSTOLIC BLOOD PRESSURE: 150 MMHG | BODY MASS INDEX: 30.31 KG/M2 | WEIGHT: 200 LBS | HEIGHT: 68 IN | DIASTOLIC BLOOD PRESSURE: 91 MMHG | HEART RATE: 78 BPM

## 2024-09-13 DIAGNOSIS — M79.646 PAIN IN UNSPECIFIED HAND: ICD-10-CM

## 2024-09-13 DIAGNOSIS — M79.643 PAIN IN UNSPECIFIED HAND: ICD-10-CM

## 2024-09-13 DIAGNOSIS — M65.30 TRIGGER FINGER, UNSPECIFIED FINGER: ICD-10-CM

## 2024-09-13 PROCEDURE — 99214 OFFICE O/P EST MOD 30 MIN: CPT | Mod: 25

## 2024-09-13 PROCEDURE — 20600 DRAIN/INJ JOINT/BURSA W/O US: CPT | Mod: F8,F3

## 2024-09-13 NOTE — PHYSICAL EXAM
[de-identified] : Examination of the [bilateral] wrist reveals discomfort with compression at the level of the volar carpal tunnel eliciting numbness/tingling throughout the fingertips Examination of the hand(s) particularly at the A1 of the bilateral ring reveals tenderness with a palpable click. Examination of bilateral hands at the dorsum reveals swelling and healed sites of puncture marks. No signs of active infection

## 2024-09-13 NOTE — HISTORY OF PRESENT ILLNESS
[FreeTextEntry1] : Liana is a 49-year-old female who presents today for follow-up for chronic exacerbated bilateral hand and wrist discomfort as well as ring finger pain.  She describes tremendous relief with her carpal tunnel injections last visit and is no longer experiencing numbness or tingling in her bilateral wrist and hands.  Symptoms today are bothersome and are affecting her ADLs.

## 2024-09-13 NOTE — ASSESSMENT
[FreeTextEntry1] : ASSESSMENT: The patient comes in today for follow-up for chronic exacerbated bilateral hand and wrist discomfort as well as bilateral ring finger pain.  She describes tremendous relief with her carpal tunnel injections last visit and is no longer experiencing numbness or tingling.  Symptoms today are bothersome and are affecting her ADLs.  Symptoms today are consistent with bilateral ring trigger fingers.  Treatment modalities were discussed, the patient elects for injections.  We have also discussed activity modifications.   The patient was adequately and thoroughly informed of my assessment of their current condition(s).  - This may diminish bodily function for the extremity.  We discussed prognosis, treatment modalities including operative and nonoperative options for the above diagnostic assessment. As always, 2nd opinion is always provided as an option. For this, when accessible, I was able to review other physicians note(s) including reviewing other tests, imaging results as well as personally view these results for my own interpretation.      Injection:   The risks and benefits of a steroid injection were discussed in detail. The risks include but are not limited to: pain, infection, swelling, flare response, bleeding, subcutaneous fat atrophy, skin depigmentation and/or elevation of blood sugar. The risk of incomplete resolution of symptoms, recurrence and additional intervention was reviewed and considered by the patient.  The patient agreed to proceed and under a sterile prep, I injected 1 unit (6mg) into 1 cc of a combination of Celestone and Lidocaine into the [bilateral ring trigger]. The patient tolerated the injection well.   The patient was adequately and thoroughly informed of my assessment of their current condition(s).   DISCUSSION: 1.  Injections as above.  Activity modifications. 2. [x] 3. [x]

## 2025-01-30 ENCOUNTER — APPOINTMENT (OUTPATIENT)
Dept: ORTHOPEDIC SURGERY | Facility: CLINIC | Age: 50
End: 2025-01-30
Payer: MEDICAID

## 2025-01-30 VITALS
DIASTOLIC BLOOD PRESSURE: 83 MMHG | SYSTOLIC BLOOD PRESSURE: 135 MMHG | WEIGHT: 200 LBS | HEIGHT: 68 IN | BODY MASS INDEX: 30.31 KG/M2

## 2025-01-30 DIAGNOSIS — M65.341 TRIGGER FINGER, RIGHT RING FINGER: ICD-10-CM

## 2025-01-30 DIAGNOSIS — M79.646 PAIN IN UNSPECIFIED HAND: ICD-10-CM

## 2025-01-30 DIAGNOSIS — M65.342 TRIGGER FINGER, RIGHT RING FINGER: ICD-10-CM

## 2025-01-30 DIAGNOSIS — M79.643 PAIN IN UNSPECIFIED HAND: ICD-10-CM

## 2025-01-30 PROCEDURE — 99214 OFFICE O/P EST MOD 30 MIN: CPT | Mod: 25

## 2025-01-30 PROCEDURE — 20550 NJX 1 TENDON SHEATH/LIGAMENT: CPT | Mod: 50

## 2025-01-30 PROCEDURE — A4649 SURGICAL SUPPLIES: CPT

## 2025-02-05 ENCOUNTER — APPOINTMENT (OUTPATIENT)
Dept: ORTHOPEDIC SURGERY | Facility: CLINIC | Age: 50
End: 2025-02-05
Payer: MEDICAID

## 2025-02-05 VITALS
DIASTOLIC BLOOD PRESSURE: 86 MMHG | HEART RATE: 76 BPM | SYSTOLIC BLOOD PRESSURE: 129 MMHG | BODY MASS INDEX: 30.31 KG/M2 | WEIGHT: 200 LBS | HEIGHT: 68 IN

## 2025-02-05 DIAGNOSIS — Z96.651 AFTERCARE FOLLOWING JOINT REPLACEMENT SURGERY: ICD-10-CM

## 2025-02-05 DIAGNOSIS — Z47.1 AFTERCARE FOLLOWING JOINT REPLACEMENT SURGERY: ICD-10-CM

## 2025-02-05 DIAGNOSIS — M17.12 UNILATERAL PRIMARY OSTEOARTHRITIS, LEFT KNEE: ICD-10-CM

## 2025-02-05 PROCEDURE — 20610 DRAIN/INJ JOINT/BURSA W/O US: CPT | Mod: LT

## 2025-02-05 PROCEDURE — A4649 SURGICAL SUPPLIES: CPT

## 2025-02-05 PROCEDURE — 73562 X-RAY EXAM OF KNEE 3: CPT | Mod: RT

## 2025-02-05 PROCEDURE — 99214 OFFICE O/P EST MOD 30 MIN: CPT | Mod: 25

## 2025-07-30 ENCOUNTER — APPOINTMENT (OUTPATIENT)
Dept: ORTHOPEDIC SURGERY | Facility: CLINIC | Age: 50
End: 2025-07-30

## 2025-07-30 DIAGNOSIS — M65.342 TRIGGER FINGER, RIGHT RING FINGER: ICD-10-CM

## 2025-07-30 DIAGNOSIS — M25.641 STIFFNESS OF RIGHT HAND, NOT ELSEWHERE CLASSIFIED: ICD-10-CM

## 2025-07-30 DIAGNOSIS — M25.642 STIFFNESS OF RIGHT HAND, NOT ELSEWHERE CLASSIFIED: ICD-10-CM

## 2025-07-30 DIAGNOSIS — M65.341 TRIGGER FINGER, RIGHT RING FINGER: ICD-10-CM

## 2025-07-30 DIAGNOSIS — G56.03 CARPAL TUNNEL SYNDROM,BILATERAL UPPER LIMBS: ICD-10-CM
